# Patient Record
Sex: FEMALE | Race: WHITE | Employment: OTHER | URBAN - METROPOLITAN AREA
[De-identification: names, ages, dates, MRNs, and addresses within clinical notes are randomized per-mention and may not be internally consistent; named-entity substitution may affect disease eponyms.]

---

## 2017-11-28 PROBLEM — M19.90 GENERALIZED ARTHRITIS: Status: ACTIVE | Noted: 2017-11-28

## 2017-12-18 PROBLEM — E03.9 ACQUIRED HYPOTHYROIDISM: Status: ACTIVE | Noted: 2017-12-18

## 2017-12-18 PROBLEM — G30.9 ALZHEIMER'S DEMENTIA WITHOUT BEHAVIORAL DISTURBANCE (HCC): Chronic | Status: ACTIVE | Noted: 2017-12-18

## 2017-12-18 PROBLEM — F02.80 ALZHEIMER'S DEMENTIA WITHOUT BEHAVIORAL DISTURBANCE (HCC): Chronic | Status: ACTIVE | Noted: 2017-12-18

## 2018-01-05 PROBLEM — F33.9 RECURRENT DEPRESSION (HCC): Status: ACTIVE | Noted: 2018-01-05

## 2018-03-05 PROBLEM — E78.00 PURE HYPERCHOLESTEROLEMIA: Status: ACTIVE | Noted: 2018-03-05

## 2018-03-05 PROBLEM — F03.B0 MODERATE DEMENTIA WITHOUT BEHAVIORAL DISTURBANCE: Status: ACTIVE | Noted: 2018-03-05

## 2018-03-05 PROBLEM — E03.9 HYPOTHYROIDISM, ACQUIRED: Status: ACTIVE | Noted: 2018-03-05

## 2018-05-21 PROBLEM — G62.9 PERIPHERAL POLYNEUROPATHY: Status: ACTIVE | Noted: 2018-05-21

## 2018-10-23 ENCOUNTER — APPOINTMENT (OUTPATIENT)
Dept: CT IMAGING | Age: 83
End: 2018-10-23
Attending: EMERGENCY MEDICINE
Payer: MEDICARE

## 2018-10-23 ENCOUNTER — HOSPITAL ENCOUNTER (EMERGENCY)
Age: 83
Discharge: HOME | End: 2018-10-23
Attending: EMERGENCY MEDICINE
Payer: MEDICARE

## 2018-10-23 VITALS
BODY MASS INDEX: 22.82 KG/M2 | RESPIRATION RATE: 16 BRPM | HEART RATE: 69 BPM | OXYGEN SATURATION: 94 % | DIASTOLIC BLOOD PRESSURE: 84 MMHG | HEIGHT: 62 IN | WEIGHT: 124 LBS | TEMPERATURE: 98.3 F | SYSTOLIC BLOOD PRESSURE: 158 MMHG

## 2018-10-23 DIAGNOSIS — E78.00 PURE HYPERCHOLESTEROLEMIA: ICD-10-CM

## 2018-10-23 DIAGNOSIS — G30.9 ALZHEIMER'S DEMENTIA WITHOUT BEHAVIORAL DISTURBANCE, UNSPECIFIED TIMING OF DEMENTIA ONSET: Chronic | ICD-10-CM

## 2018-10-23 DIAGNOSIS — R10.13 ABDOMINAL PAIN, EPIGASTRIC: Primary | ICD-10-CM

## 2018-10-23 DIAGNOSIS — F02.80 ALZHEIMER'S DEMENTIA WITHOUT BEHAVIORAL DISTURBANCE, UNSPECIFIED TIMING OF DEMENTIA ONSET: Chronic | ICD-10-CM

## 2018-10-23 DIAGNOSIS — I10 ESSENTIAL HYPERTENSION: Chronic | ICD-10-CM

## 2018-10-23 LAB
ALBUMIN SERPL-MCNC: 3.6 G/DL (ref 3.2–4.6)
ALBUMIN/GLOB SERPL: 1 {RATIO} (ref 1.2–3.5)
ALP SERPL-CCNC: 62 U/L (ref 50–136)
ALT SERPL-CCNC: 25 U/L (ref 12–65)
ANION GAP SERPL CALC-SCNC: 7 MMOL/L (ref 7–16)
AST SERPL-CCNC: 28 U/L (ref 15–37)
BASOPHILS # BLD: 0 K/UL (ref 0–0.2)
BASOPHILS NFR BLD: 1 % (ref 0–2)
BILIRUB SERPL-MCNC: 0.3 MG/DL (ref 0.2–1.1)
BUN SERPL-MCNC: 20 MG/DL (ref 8–23)
CALCIUM SERPL-MCNC: 9.2 MG/DL (ref 8.3–10.4)
CHLORIDE SERPL-SCNC: 108 MMOL/L (ref 98–107)
CO2 SERPL-SCNC: 29 MMOL/L (ref 21–32)
CREAT SERPL-MCNC: 1.05 MG/DL (ref 0.6–1)
DIFFERENTIAL METHOD BLD: ABNORMAL
EOSINOPHIL # BLD: 0 K/UL (ref 0–0.8)
EOSINOPHIL NFR BLD: 1 % (ref 0.5–7.8)
ERYTHROCYTE [DISTWIDTH] IN BLOOD BY AUTOMATED COUNT: 12.6 %
GLOBULIN SER CALC-MCNC: 3.7 G/DL (ref 2.3–3.5)
GLUCOSE SERPL-MCNC: 91 MG/DL (ref 65–100)
HCT VFR BLD AUTO: 40.1 % (ref 35.8–46.3)
HGB BLD-MCNC: 12.6 G/DL (ref 11.7–15.4)
IMM GRANULOCYTES # BLD: 0 K/UL (ref 0–0.5)
IMM GRANULOCYTES NFR BLD AUTO: 0 % (ref 0–5)
LIPASE SERPL-CCNC: 123 U/L (ref 73–393)
LYMPHOCYTES # BLD: 1.3 K/UL (ref 0.5–4.6)
LYMPHOCYTES NFR BLD: 27 % (ref 13–44)
MCH RBC QN AUTO: 34 PG (ref 26.1–32.9)
MCHC RBC AUTO-ENTMCNC: 31.4 G/DL (ref 31.4–35)
MCV RBC AUTO: 108.1 FL (ref 79.6–97.8)
MONOCYTES # BLD: 0.5 K/UL (ref 0.1–1.3)
MONOCYTES NFR BLD: 10 % (ref 4–12)
NEUTS SEG # BLD: 3 K/UL (ref 1.7–8.2)
NEUTS SEG NFR BLD: 61 % (ref 43–78)
NRBC # BLD: 0 K/UL (ref 0–0.2)
PLATELET # BLD AUTO: 239 K/UL (ref 150–450)
PMV BLD AUTO: 9.9 FL (ref 9.4–12.3)
POTASSIUM SERPL-SCNC: 4.6 MMOL/L (ref 3.5–5.1)
PROT SERPL-MCNC: 7.3 G/DL (ref 6.3–8.2)
RBC # BLD AUTO: 3.71 M/UL (ref 4.05–5.2)
SODIUM SERPL-SCNC: 144 MMOL/L (ref 136–145)
WBC # BLD AUTO: 4.9 K/UL (ref 4.3–11.1)

## 2018-10-23 PROCEDURE — 74011000258 HC RX REV CODE- 258: Performed by: EMERGENCY MEDICINE

## 2018-10-23 PROCEDURE — 85025 COMPLETE CBC W/AUTO DIFF WBC: CPT

## 2018-10-23 PROCEDURE — 80053 COMPREHEN METABOLIC PANEL: CPT

## 2018-10-23 PROCEDURE — 74177 CT ABD & PELVIS W/CONTRAST: CPT

## 2018-10-23 PROCEDURE — 96360 HYDRATION IV INFUSION INIT: CPT | Performed by: EMERGENCY MEDICINE

## 2018-10-23 PROCEDURE — 83690 ASSAY OF LIPASE: CPT

## 2018-10-23 PROCEDURE — 81003 URINALYSIS AUTO W/O SCOPE: CPT | Performed by: EMERGENCY MEDICINE

## 2018-10-23 PROCEDURE — 74011250636 HC RX REV CODE- 250/636: Performed by: EMERGENCY MEDICINE

## 2018-10-23 PROCEDURE — 99284 EMERGENCY DEPT VISIT MOD MDM: CPT | Performed by: EMERGENCY MEDICINE

## 2018-10-23 PROCEDURE — 74011636320 HC RX REV CODE- 636/320: Performed by: EMERGENCY MEDICINE

## 2018-10-23 RX ORDER — SODIUM CHLORIDE 0.9 % (FLUSH) 0.9 %
10 SYRINGE (ML) INJECTION
Status: COMPLETED | OUTPATIENT
Start: 2018-10-23 | End: 2018-10-23

## 2018-10-23 RX ADMIN — SODIUM CHLORIDE 100 ML: 900 INJECTION, SOLUTION INTRAVENOUS at 17:46

## 2018-10-23 RX ADMIN — SODIUM CHLORIDE 500 ML: 900 INJECTION, SOLUTION INTRAVENOUS at 16:49

## 2018-10-23 RX ADMIN — Medication 10 ML: at 17:46

## 2018-10-23 RX ADMIN — DIATRIZOATE MEGLUMINE AND DIATRIZOATE SODIUM 15 ML: 660; 100 LIQUID ORAL; RECTAL at 16:49

## 2018-10-23 RX ADMIN — IOPAMIDOL 100 ML: 755 INJECTION, SOLUTION INTRAVENOUS at 17:46

## 2018-10-23 NOTE — DISCHARGE INSTRUCTIONS

## 2018-10-23 NOTE — ED TRIAGE NOTES
Patient went to PCP and started throwing up. States that she was there for her monthly check up. Says that her Douglas Fernandes has been giving me trouble. \" Point to diaphragm area. States that she has headache, neck pain and leg pain, Reports that she suddenly became nauseated and threw up once. Patient arrived from the office via EMS. Patient is a poor historian.

## 2018-10-23 NOTE — ED NOTES
I have reviewed discharge instructions with the patient. The patient verbalized understanding. Patient left ED via Discharge Method: wheelchair to Home with transport from [facility]. The patient has been wheeled to car via nurse and appears in no acute distress. The patient has been provided discharge instructions and follow up information. The patient does not have any questions at this time. Opportunity for questions and clarification provided. Patient given 0 scripts. To continue your aftercare when you leave the hospital, you may receive an automated call from our care team to check in on how you are doing. This is a free service and part of our promise to provide the best care and service to meet your aftercare needs.  If you have questions, or wish to unsubscribe from this service please call [phone]. Thank you for Choosing our [facility] Emergency Department.

## 2018-10-23 NOTE — ED PROVIDER NOTES
Patient is a poor historian, states she has been having upper abdominal pain \"for a while\" off and on. She is unable to specify further. She had an appointment today with her doctor. While there, she started vomiting and having her abdominal pain again. Her physician was concerned and sent her here for evaluation. She denies any workup in the past, is unable to describe her pain further. She does not remember what surgeries she has had in the past. 
 
Elements of this note were created using speech recognition software. As such, errors of speech recognition may be present. Past Medical History:  
Diagnosis Date  Arthritis  Atrial fibrillation ([facility]  Dementia  Depression  Heart failure ([facility] [address] [facility]  Hyperlipidemia  Hypertension  Hypothyroidism  Other ill-defined conditions(799.89) Past Surgical History:  
Procedure Laterality Date  HX CHOLECYSTECTOMY  HX HYSTERECTOMY  HX ORTHOPAEDIC    
 BACK  HX SMALL BOWEL RESECTION  8/8/13  HX TONSILLECTOMY  HX TUBAL LIGATION Family History:  
Problem Relation Age of Onset  Heart Disease Mother  Cancer Father   
     bladder  Cancer Brother   
     lung Social History Socioeconomic History  Marital status:  Spouse name: Not on file  Number of children: Not on file  Years of education: Not on file  Highest education level: Not on file Social Needs  Financial resource strain: Not on file  Food insecurity - worry: Not on file  Food insecurity - inability: Not on file  Transportation needs - medical: Not on file  Transportation needs - non-medical: Not on file Occupational History  Not on file Tobacco Use  Smoking status: Never Smoker  Smokeless tobacco: Never Used Substance and Sexual Activity  Alcohol use: No  
 Drug use: No  
 Sexual activity: No  
Other Topics Concern  Not on file Social History Narrative  Not on file ALLERGIES: Patient has no known allergies. Review of Systems Constitutional: Negative for chills and fever. Gastrointestinal: Negative for nausea and vomiting. All other systems reviewed and are negative. Vitals:  
 10/23/18 1516 BP: 147/80 Pulse: 66 Resp: 16 Temp: 98.2 °F (36.8 °C) SpO2: 94% Weight: 56.2 kg (124 lb) Height: 5' 2\" (1.575 m) Physical Exam  
Constitutional: She is oriented to person, place, and time. She appears well-developed and well-nourished. HENT:  
Head: Normocephalic and atraumatic. Eyes: Conjunctivae are normal. Pupils are equal, round, and reactive to light. Neck: Normal range of motion. Neck supple. Abdominal: Soft. There is tenderness. Epigastric and right upper quadrant tenderness to palpation as indicated. No rebound or guarding Musculoskeletal: She exhibits no edema or tenderness. Neurological: She is alert and oriented to person, place, and time. Skin: Skin is warm and dry. Psychiatric: She has a normal mood and affect. Her behavior is normal.  
Nursing note and vitals reviewed. MDM Number of Diagnoses or Management Options Abdominal pain, epigastric: new and requires workup Alzheimer's dementia without behavioral disturbance, unspecified timing of dementia onset:  
Essential hypertension: Pure hypercholesterolemia:  
Diagnosis management comments: 6:37 PM discussed results with patient, unremarkable CT. Crystal Zapata She has had a cholecystectomy, no abnormalities seen. Her repeat abdominal exam is benign and she appears comfortable and in no distress. Amount and/or Complexity of Data Reviewed Clinical lab tests: ordered and reviewed Tests in the radiology section of CPT®: ordered and reviewed Risk of Complications, Morbidity, and/or Mortality Presenting problems: moderate Diagnostic procedures: moderate Management options: moderate Patient Progress Patient progress: stable Procedures

## 2018-10-25 ENCOUNTER — HOME HEALTH ADMISSION (OUTPATIENT)
Dept: HOME HEALTH SERVICES | Facility: HOME HEALTH | Age: 83
End: 2018-10-25

## 2019-03-27 ENCOUNTER — HOSPITAL ENCOUNTER (EMERGENCY)
Dept: GENERAL RADIOLOGY | Age: 84
Discharge: HOME | DRG: 481 | End: 2019-03-27
Attending: EMERGENCY MEDICINE
Payer: MEDICARE

## 2019-03-27 ENCOUNTER — APPOINTMENT (OUTPATIENT)
Dept: CT IMAGING | Age: 84
DRG: 481 | End: 2019-03-27
Attending: EMERGENCY MEDICINE
Payer: MEDICARE

## 2019-03-27 ENCOUNTER — HOSPITAL ENCOUNTER (INPATIENT)
Age: 84
LOS: 5 days | Discharge: NURSING, REHAB OR LONG-TERM CARE | DRG: 481 | End: 2019-04-01
Attending: EMERGENCY MEDICINE | Admitting: INTERNAL MEDICINE
Payer: MEDICARE

## 2019-03-27 DIAGNOSIS — S72.142A CLOSED 2-PART INTERTROCHANTERIC FRACTURE OF LEFT FEMUR, INITIAL ENCOUNTER (HCC): Primary | ICD-10-CM

## 2019-03-27 PROBLEM — S72.002A HIP FRACTURE, LEFT (HCC): Status: ACTIVE | Noted: 2019-03-27

## 2019-03-27 LAB
ALBUMIN SERPL-MCNC: 3.2 G/DL (ref 3.2–4.6)
ALBUMIN/GLOB SERPL: 1.2 {RATIO} (ref 1.2–3.5)
ALP SERPL-CCNC: 69 U/L (ref 50–136)
ALT SERPL-CCNC: 30 U/L (ref 12–65)
ANION GAP SERPL CALC-SCNC: 6 MMOL/L (ref 7–16)
APPEARANCE UR: ABNORMAL
APTT PPP: 29.2 SEC (ref 24.7–39.8)
AST SERPL-CCNC: 34 U/L (ref 15–37)
BACTERIA SPEC CULT: ABNORMAL
BACTERIA SPEC CULT: ABNORMAL
BACTERIA URNS QL MICRO: ABNORMAL /HPF
BASOPHILS # BLD: 0 K/UL (ref 0–0.2)
BASOPHILS NFR BLD: 0 % (ref 0–2)
BILIRUB SERPL-MCNC: 0.5 MG/DL (ref 0.2–1.1)
BILIRUB UR QL: NEGATIVE
BUN SERPL-MCNC: 24 MG/DL (ref 8–23)
CALCIUM SERPL-MCNC: 8.3 MG/DL (ref 8.3–10.4)
CALCIUM SERPL-MCNC: 8.4 MG/DL (ref 8.3–10.4)
CASTS URNS QL MICRO: ABNORMAL /LPF
CHLORIDE SERPL-SCNC: 108 MMOL/L (ref 98–107)
CO2 SERPL-SCNC: 30 MMOL/L (ref 21–32)
COLOR UR: ABNORMAL
CREAT SERPL-MCNC: 1.03 MG/DL (ref 0.6–1)
CRYSTALS URNS QL MICRO: ABNORMAL /LPF
DIFFERENTIAL METHOD BLD: ABNORMAL
EOSINOPHIL # BLD: 0 K/UL (ref 0–0.8)
EOSINOPHIL NFR BLD: 0 % (ref 0.5–7.8)
EPI CELLS #/AREA URNS HPF: ABNORMAL /HPF
ERYTHROCYTE [DISTWIDTH] IN BLOOD BY AUTOMATED COUNT: 12.2 % (ref 11.9–14.6)
GLOBULIN SER CALC-MCNC: 2.6 G/DL (ref 2.3–3.5)
GLUCOSE SERPL-MCNC: 126 MG/DL (ref 65–100)
GLUCOSE UR STRIP.AUTO-MCNC: NEGATIVE MG/DL
HCT VFR BLD AUTO: 30.7 % (ref 35.8–46.3)
HGB BLD-MCNC: 9.7 G/DL (ref 11.7–15.4)
HGB UR QL STRIP: NEGATIVE
IMM GRANULOCYTES # BLD AUTO: 0.1 K/UL (ref 0–0.5)
IMM GRANULOCYTES NFR BLD AUTO: 1 % (ref 0–5)
INR PPP: 1
KETONES UR QL STRIP.AUTO: NEGATIVE MG/DL
LEUKOCYTE ESTERASE UR QL STRIP.AUTO: ABNORMAL
LYMPHOCYTES # BLD: 1 K/UL (ref 0.5–4.6)
LYMPHOCYTES NFR BLD: 8 % (ref 13–44)
MCH RBC QN AUTO: 33.2 PG (ref 26.1–32.9)
MCHC RBC AUTO-ENTMCNC: 31.6 G/DL (ref 31.4–35)
MCV RBC AUTO: 105.1 FL (ref 79.6–97.8)
MONOCYTES # BLD: 1.4 K/UL (ref 0.1–1.3)
MONOCYTES NFR BLD: 11 % (ref 4–12)
MUCOUS THREADS URNS QL MICRO: ABNORMAL /LPF
NEUTS SEG # BLD: 10.2 K/UL (ref 1.7–8.2)
NEUTS SEG NFR BLD: 81 % (ref 43–78)
NITRITE UR QL STRIP.AUTO: NEGATIVE
NRBC # BLD: 0 K/UL (ref 0–0.2)
OTHER OBSERVATIONS,UCOM: ABNORMAL
PH UR STRIP: 5.5 [PH] (ref 5–9)
PLATELET # BLD AUTO: 215 K/UL (ref 150–450)
PMV BLD AUTO: 10.2 FL (ref 9.4–12.3)
POTASSIUM SERPL-SCNC: 4 MMOL/L (ref 3.5–5.1)
PREALB SERPL-MCNC: 23.5 MG/DL (ref 18–35.7)
PROT SERPL-MCNC: 5.8 G/DL (ref 6.3–8.2)
PROT UR STRIP-MCNC: NEGATIVE MG/DL
PROTHROMBIN TIME: 13.4 SEC (ref 11.7–14.5)
PTH-INTACT SERPL-MCNC: 61.7 PG/ML (ref 18.5–88)
RBC # BLD AUTO: 2.92 M/UL (ref 4.05–5.2)
RBC #/AREA URNS HPF: ABNORMAL /HPF
SERVICE CMNT-IMP: ABNORMAL
SODIUM SERPL-SCNC: 144 MMOL/L (ref 136–145)
SP GR UR REFRACTOMETRY: 1.03 (ref 1–1.02)
UROBILINOGEN UR QL STRIP.AUTO: 0.2 EU/DL (ref 0.2–1)
WBC # BLD AUTO: 12.7 K/UL (ref 4.3–11.1)
WBC URNS QL MICRO: ABNORMAL /HPF

## 2019-03-27 PROCEDURE — 84134 ASSAY OF PREALBUMIN: CPT

## 2019-03-27 PROCEDURE — 65270000029 HC RM PRIVATE

## 2019-03-27 PROCEDURE — 74011000302 HC RX REV CODE- 302: Performed by: INTERNAL MEDICINE

## 2019-03-27 PROCEDURE — 77030034849

## 2019-03-27 PROCEDURE — 81001 URINALYSIS AUTO W/SCOPE: CPT

## 2019-03-27 PROCEDURE — 74011250636 HC RX REV CODE- 250/636: Performed by: NURSE PRACTITIONER

## 2019-03-27 PROCEDURE — 85610 PROTHROMBIN TIME: CPT

## 2019-03-27 PROCEDURE — 77030021907 HC KT URIN FOL O&M -A

## 2019-03-27 PROCEDURE — 85025 COMPLETE CBC W/AUTO DIFF WBC: CPT

## 2019-03-27 PROCEDURE — 73552 X-RAY EXAM OF FEMUR 2/>: CPT

## 2019-03-27 PROCEDURE — 74011250637 HC RX REV CODE- 250/637: Performed by: NURSE PRACTITIONER

## 2019-03-27 PROCEDURE — 93005 ELECTROCARDIOGRAM TRACING: CPT | Performed by: EMERGENCY MEDICINE

## 2019-03-27 PROCEDURE — 77030020263 HC SOL INJ SOD CL0.9% LFCR 1000ML

## 2019-03-27 PROCEDURE — 83970 ASSAY OF PARATHORMONE: CPT

## 2019-03-27 PROCEDURE — 80053 COMPREHEN METABOLIC PANEL: CPT

## 2019-03-27 PROCEDURE — 74011250637 HC RX REV CODE- 250/637: Performed by: INTERNAL MEDICINE

## 2019-03-27 PROCEDURE — 74011250637 HC RX REV CODE- 250/637: Performed by: ORTHOPAEDIC SURGERY

## 2019-03-27 PROCEDURE — 73502 X-RAY EXAM HIP UNI 2-3 VIEWS: CPT

## 2019-03-27 PROCEDURE — 74011250636 HC RX REV CODE- 250/636: Performed by: INTERNAL MEDICINE

## 2019-03-27 PROCEDURE — 87641 MR-STAPH DNA AMP PROBE: CPT

## 2019-03-27 PROCEDURE — 71045 X-RAY EXAM CHEST 1 VIEW: CPT

## 2019-03-27 PROCEDURE — 99285 EMERGENCY DEPT VISIT HI MDM: CPT | Performed by: EMERGENCY MEDICINE

## 2019-03-27 PROCEDURE — 86580 TB INTRADERMAL TEST: CPT | Performed by: INTERNAL MEDICINE

## 2019-03-27 PROCEDURE — 82306 VITAMIN D 25 HYDROXY: CPT

## 2019-03-27 PROCEDURE — 85730 THROMBOPLASTIN TIME PARTIAL: CPT

## 2019-03-27 PROCEDURE — 86900 BLOOD TYPING SEROLOGIC ABO: CPT

## 2019-03-27 PROCEDURE — 77030032490 HC SLV COMPR SCD KNE COVD -B

## 2019-03-27 PROCEDURE — 77030036696 HC BOOT TRACT BUCKS S2SG -A

## 2019-03-27 PROCEDURE — 86923 COMPATIBILITY TEST ELECTRIC: CPT

## 2019-03-27 PROCEDURE — 70450 CT HEAD/BRAIN W/O DYE: CPT

## 2019-03-27 RX ORDER — AMIODARONE HYDROCHLORIDE 200 MG/1
200 TABLET ORAL DAILY
Status: DISCONTINUED | OUTPATIENT
Start: 2019-03-28 | End: 2019-04-01 | Stop reason: HOSPADM

## 2019-03-27 RX ORDER — PRAVASTATIN SODIUM 20 MG/1
40 TABLET ORAL
Status: DISCONTINUED | OUTPATIENT
Start: 2019-03-27 | End: 2019-04-01 | Stop reason: HOSPADM

## 2019-03-27 RX ORDER — ACETAMINOPHEN 325 MG/1
650 TABLET ORAL EVERY 8 HOURS
Status: DISCONTINUED | OUTPATIENT
Start: 2019-03-27 | End: 2019-03-28 | Stop reason: HOSPADM

## 2019-03-27 RX ORDER — ONDANSETRON 2 MG/ML
4 INJECTION INTRAMUSCULAR; INTRAVENOUS
Status: DISCONTINUED | OUTPATIENT
Start: 2019-03-27 | End: 2019-03-28 | Stop reason: SDUPTHER

## 2019-03-27 RX ORDER — PANTOPRAZOLE SODIUM 40 MG/1
40 TABLET, DELAYED RELEASE ORAL
Status: DISCONTINUED | OUTPATIENT
Start: 2019-03-28 | End: 2019-04-01 | Stop reason: HOSPADM

## 2019-03-27 RX ORDER — SODIUM CHLORIDE 9 MG/ML
75 INJECTION, SOLUTION INTRAVENOUS CONTINUOUS
Status: DISCONTINUED | OUTPATIENT
Start: 2019-03-27 | End: 2019-03-28 | Stop reason: HOSPADM

## 2019-03-27 RX ORDER — TRAMADOL HYDROCHLORIDE 50 MG/1
50 TABLET ORAL
Status: DISCONTINUED | OUTPATIENT
Start: 2019-03-27 | End: 2019-03-27 | Stop reason: SDUPTHER

## 2019-03-27 RX ORDER — OXYCODONE HYDROCHLORIDE 5 MG/1
5 TABLET ORAL
Status: DISCONTINUED | OUTPATIENT
Start: 2019-03-27 | End: 2019-03-28

## 2019-03-27 RX ORDER — DULOXETIN HYDROCHLORIDE 60 MG/1
60 CAPSULE, DELAYED RELEASE ORAL 2 TIMES DAILY
Status: DISCONTINUED | OUTPATIENT
Start: 2019-03-27 | End: 2019-04-01 | Stop reason: HOSPADM

## 2019-03-27 RX ORDER — LEVOTHYROXINE SODIUM 100 UG/1
100 TABLET ORAL
Status: DISCONTINUED | OUTPATIENT
Start: 2019-03-28 | End: 2019-04-01 | Stop reason: HOSPADM

## 2019-03-27 RX ORDER — SODIUM CHLORIDE 0.9 % (FLUSH) 0.9 %
5-40 SYRINGE (ML) INJECTION AS NEEDED
Status: DISCONTINUED | OUTPATIENT
Start: 2019-03-27 | End: 2019-03-28 | Stop reason: HOSPADM

## 2019-03-27 RX ORDER — HYDROMORPHONE HYDROCHLORIDE 1 MG/ML
0.5 INJECTION, SOLUTION INTRAMUSCULAR; INTRAVENOUS; SUBCUTANEOUS
Status: DISCONTINUED | OUTPATIENT
Start: 2019-03-27 | End: 2019-03-28

## 2019-03-27 RX ORDER — GABAPENTIN 300 MG/1
300 CAPSULE ORAL 3 TIMES DAILY
Status: DISCONTINUED | OUTPATIENT
Start: 2019-03-27 | End: 2019-04-01 | Stop reason: HOSPADM

## 2019-03-27 RX ORDER — MONTELUKAST SODIUM 10 MG/1
10 TABLET ORAL DAILY
Status: DISCONTINUED | OUTPATIENT
Start: 2019-03-28 | End: 2019-04-01 | Stop reason: HOSPADM

## 2019-03-27 RX ORDER — SODIUM CHLORIDE 0.9 % (FLUSH) 0.9 %
5-40 SYRINGE (ML) INJECTION EVERY 8 HOURS
Status: DISCONTINUED | OUTPATIENT
Start: 2019-03-27 | End: 2019-03-28 | Stop reason: HOSPADM

## 2019-03-27 RX ORDER — DONEPEZIL HYDROCHLORIDE 5 MG/1
10 TABLET, FILM COATED ORAL
Status: DISCONTINUED | OUTPATIENT
Start: 2019-03-27 | End: 2019-04-01 | Stop reason: HOSPADM

## 2019-03-27 RX ORDER — HYDROCODONE BITARTRATE AND ACETAMINOPHEN 7.5; 325 MG/1; MG/1
1 TABLET ORAL
Status: DISCONTINUED | OUTPATIENT
Start: 2019-03-27 | End: 2019-03-27 | Stop reason: SDUPTHER

## 2019-03-27 RX ADMIN — SODIUM CHLORIDE 75 ML/HR: 900 INJECTION, SOLUTION INTRAVENOUS at 15:18

## 2019-03-27 RX ADMIN — DONEPEZIL HYDROCHLORIDE 10 MG: 5 TABLET, FILM COATED ORAL at 20:20

## 2019-03-27 RX ADMIN — ACETAMINOPHEN 650 MG: 325 TABLET, FILM COATED ORAL at 15:18

## 2019-03-27 RX ADMIN — PRAVASTATIN SODIUM 40 MG: 20 TABLET ORAL at 20:20

## 2019-03-27 RX ADMIN — OXYCODONE HYDROCHLORIDE 5 MG: 5 TABLET ORAL at 20:20

## 2019-03-27 RX ADMIN — GABAPENTIN 300 MG: 300 CAPSULE ORAL at 20:20

## 2019-03-27 RX ADMIN — ONDANSETRON 4 MG: 2 INJECTION INTRAMUSCULAR; INTRAVENOUS at 21:18

## 2019-03-27 RX ADMIN — HYDROMORPHONE HYDROCHLORIDE 0.5 MG: 1 INJECTION, SOLUTION INTRAMUSCULAR; INTRAVENOUS; SUBCUTANEOUS at 23:43

## 2019-03-27 RX ADMIN — DULOXETINE 60 MG: 60 CAPSULE, DELAYED RELEASE ORAL at 17:26

## 2019-03-27 RX ADMIN — Medication 1 AMPULE: at 20:21

## 2019-03-27 RX ADMIN — TUBERCULIN PURIFIED PROTEIN DERIVATIVE 5 UNITS: 5 INJECTION, SOLUTION INTRADERMAL at 15:39

## 2019-03-27 RX ADMIN — Medication 5 ML: at 15:18

## 2019-03-27 RX ADMIN — ACETAMINOPHEN 650 MG: 325 TABLET, FILM COATED ORAL at 20:20

## 2019-03-27 RX ADMIN — GABAPENTIN 300 MG: 300 CAPSULE ORAL at 15:18

## 2019-03-27 NOTE — PROGRESS NOTES
Pt's daughter Nevaeh De Leon left name and number on board: [phone]. Brandyn Olea stated she would be available on this line at any time if any needs of the patient present.

## 2019-03-27 NOTE — ED PROVIDER NOTES
Patient presents by EMS from home after apparent fall last night that was unwitnessed. It is presumed the patient managed to drag herself in her bedroom where she was found. Patient cannot give a clear history or review of systems at this time, as there is a history of dementia. The history is provided by the patient, the EMS personnel and medical records. Hip Pain This is a new problem. The current episode started 6 to 12 hours ago. The problem occurs constantly. The problem has not changed since onset. The pain is present in the left hip. The quality of the pain is described as aching. The pain is at a severity of 6/10. The pain is moderate. Pertinent negatives include no numbness, no stiffness, no tingling, no back pain and no neck pain. The symptoms are aggravated by contact, movement and palpation. She has tried nothing for the symptoms. The treatment provided no relief. There has been a history of trauma. Past Medical History:  
Diagnosis Date  Arthritis  Atrial fibrillation ([address]  Dementia  Depression  Heart failure ([address]  Hyperlipidemia  Hypertension  Hypothyroidism  Other ill-defined conditions(799.89) Past Surgical History:  
Procedure Laterality Date  HX CHOLECYSTECTOMY  HX HYSTERECTOMY  HX ORTHOPAEDIC    
 BACK  HX SMALL BOWEL RESECTION  8/8/13  HX TONSILLECTOMY  HX TUBAL LIGATION Family History:  
Problem Relation Age of Onset  Heart Disease Mother  Cancer Father   
     bladder  Cancer Brother   
     lung Social History Socioeconomic History  Marital status:  Spouse name: Not on file  Number of children: Not on file  Years of education: Not on file  Highest education level: Not on file Occupational History  Not on file Social Needs  Financial resource strain: Not on file  Food insecurity:  
  Worry: Not on file Inability: Not on file  Transportation needs:  
  Medical: Not on file Non-medical: Not on file Tobacco Use  Smoking status: Never Smoker  Smokeless tobacco: Never Used Substance and Sexual Activity  Alcohol use: No  
 Drug use: No  
 Sexual activity: Never Lifestyle  Physical activity:  
  Days per week: Not on file Minutes per session: Not on file  Stress: Not on file Relationships  Social connections:  
  Talks on phone: Not on file Gets together: Not on file Attends Jew service: Not on file Active member of club or organization: Not on file Attends meetings of clubs or organizations: Not on file Relationship status: Not on file  Intimate partner violence:  
  Fear of current or ex partner: Not on file Emotionally abused: Not on file Physically abused: Not on file Forced sexual activity: Not on file Other Topics Concern  Not on file Social History Narrative  Not on file ALLERGIES: Patient has no known allergies. Review of Systems Musculoskeletal: Negative for back pain, neck pain and stiffness. Neurological: Negative for tingling and numbness. All other systems reviewed and are negative. Vitals:  
 03/27/19 1240 03/27/19 1242 03/27/19 1245 03/27/19 1250 BP: 154/71 154/71 Pulse:   67 Resp: 16 Temp: 97.5 °F (36.4 °C) SpO2:   98% 97% Weight: 52.6 kg (116 lb) Height: 5' 2\" (1.575 m) Physical Exam  
Constitutional: She appears well-developed and well-nourished. No distress. HENT:  
Head: Normocephalic and atraumatic. Nose: Nose normal.  
No evidence of trauma to the head is noted however given the unwitnessed fall and CT will be performed. Mucous membranes are dry Eyes: Pupils are equal, round, and reactive to light. Conjunctivae and EOM are normal.  
Neck: Normal range of motion. Neck supple. Cardiovascular: Normal rate and regular rhythm. Pulmonary/Chest: Effort normal and breath sounds normal.  
Abdominal: Soft. Bowel sounds are normal.  
Musculoskeletal: She exhibits tenderness and deformity. Left lower extremity demonstrates shortening and external rotation with tenderness over the left hip joint and greater trochanter. PMS intact Neurological: She is alert. She displays normal reflexes. No cranial nerve deficit or sensory deficit. She exhibits normal muscle tone. Coordination normal.  
Disoriented to time Skin: Skin is warm and dry. Capillary refill takes less than 2 seconds. She is not diaphoretic. Psychiatric: She has a normal mood and affect. Her behavior is normal.  
Nursing note and vitals reviewed. MDM Number of Diagnoses or Management Options Diagnosis management comments: Hip fracture protocol was initiated Amount and/or Complexity of Data Reviewed Clinical lab tests: ordered and reviewed Tests in the radiology section of CPT®: ordered and reviewed Review and summarize past medical records: yes Discuss the patient with other providers: yes Independent visualization of images, tracings, or specimens: yes Risk of Complications, Morbidity, and/or Mortality Presenting problems: high Diagnostic procedures: high Management options: high Patient Progress Patient progress: stable Procedures

## 2019-03-27 NOTE — ED TRIAGE NOTES
EMS called to home for fall. Pt found laying bedroom floor underneath bed. Left leg shortened and rotated. C/o headache, is on blood thinner, 18g RAC. Given 10mg morphine over 30 minute time period. On 3 L at 97%, RR 12-16, /62, bgl 106, HR 70s.

## 2019-03-27 NOTE — PROGRESS NOTES
TRANSFER - IN REPORT: 
 
Verbal report received from [facility] (name) on Enrico Campoverde  being received from ED (unit) for routine progression of care Report consisted of patients Situation, Background, Assessment and  
Recommendations(SBAR). Information from the following report(s) SBAR, Kardex, ED Summary, Intake/Output and MAR was reviewed with the receiving nurse. Opportunity for questions and clarification was provided. Assessment completed upon patients arrival to unit and care assumed.

## 2019-03-27 NOTE — PROGRESS NOTES
03/27/19 1444 Dual Skin Pressure Injury Assessment Dual Skin Pressure Injury Assessment WDL Second Care Provider (Based on [address] [place] [address]) Aisha MickHeather pardobrielle Albert Lea Skin Integumentary Skin Integumentary (WDL) X Skin Color Appropriate for ethnicity; Ecchymosis (comment) (bruises BUE, BLE) Skin Condition/Temp Dry;Fragile; Warm  
Skin Integrity Tear;Scars (comment) 
(small skin tear to RLE; scar down mid, lower back) Turgor Epidermis thin w/ loss of subcut tissue Hair Growth Present Varicosities Present Wound Prevention and Protection Methods Orientation of Wound Prevention Posterior Location of Wound Prevention Sacrum/Coccyx Dressing Present  No  
Wound Offloading (Prevention Methods) Bed, pressure reduction mattress;Pillows;Repositioning;Turning

## 2019-03-27 NOTE — PROGRESS NOTES
Problem: Falls - Risk of 
Goal: *Absence of Falls Description Document Samina Mckenna Fall Risk and appropriate interventions in the flowsheet. Outcome: Progressing Towards Goal 
  
Problem: Patient Education: Go to Patient Education Activity Goal: Patient/Family Education Outcome: Progressing Towards Goal 
  
Problem: Patient Education: Go to Patient Education Activity Goal: Patient/Family Education Outcome: Progressing Towards Goal 
  
Oriented both patient and family to room and treatment team. Educated family and patient regarding risk for falls, and ensured pt had yellow socks, side rails up x3, and call light within reach. Time was left for questions and concerns to be addressed.

## 2019-03-27 NOTE — H&P
Hospitalist H&P Note Admit Date:  3/27/2019 12:41 PM  
Name:  Enrico Campoverde Age:  80 y.o. 
:  1933 MRN:  [id number] PCP:  Steven Lawler MD 
Treatment Team: Attending Provider: Agnes Goodwin MD; Consulting Provider: Fran Guzman MD 
 
HPI/Subjective:  
Ms. Teddy Shabazz is an 79 y/o WF with a h/o AFib (unclear if on [address] [place] [address] Road), dementia, hypothyroidism, HTN, HLD brought in to the ED via EMS today after she was found on the den floor of her home by her daughter earlier this morning. Due to underlying dementia, patient does not recall the specifics of the fall. Patient's daughter says she went over to check on the patient and found her on the floor of the den and one of the [employer] was displaced. She thinks patient may have tripped from or onto the chair causing her fall. She c/o left hip pain and has an intertrochanteric hip fracture. Head CT normal, daughter feels the patient is mentating at her baseline. Patient denies any chest pain, palpitations, syncope, pre-syncope, n/v/d, fevers. She does have h/o atrial fibrillation on amiodarone and her daughter says she's on a blood thinner but can't tell me which one (and I don't see one listed on her PCP notes). Hospitalist consulted for admission. 10 systems reviewed and negative except as noted in HPI. Past Medical History:  
Diagnosis Date  Arthritis  Atrial fibrillation ([address]  Dementia  Depression  Heart failure ([address]  Hyperlipidemia  Hypertension  Hypothyroidism  Other ill-defined conditions(799.89) Past Surgical History:  
Procedure Laterality Date  HX CHOLECYSTECTOMY  HX HYSTERECTOMY  HX ORTHOPAEDIC    
 BACK  HX SMALL BOWEL RESECTION  13  HX TONSILLECTOMY  HX TUBAL LIGATION No Known Allergies Social History Tobacco Use  Smoking status: Never Smoker  Smokeless tobacco: Never Used Substance Use Topics  Alcohol use:  No  
  
 Family History Problem Relation Age of Onset  Heart Disease Mother  Cancer Father   
     bladder  Cancer Brother   
     lung Immunization History Administered Date(s) Administered  Influenza High Dose Vaccine PF 2012, 10/07/2017  Influenza Vaccine 2016  Influenza Vaccine (Quad) PF 2018  Influenza Vaccine PF 2013  Pneumococcal Conjugate (PCV-13) 2017  Pneumococcal Polysaccharide (PPSV-23) 2001  TB Skin Test (PPD) Intradermal 2013, 2013  Tdap 2017 PTA Medications: 
Prior to Admission Medications Prescriptions Last Dose Informant Patient Reported? Taking? CALCIUM CITRATE PO   Yes No  
Sig: Take  by mouth. DULoxetine (CYMBALTA) 60 mg capsule   No No  
Sig: Take 1 Cap by mouth two (2) times a day. acetaminophen (TYLENOL EXTRA STRENGTH) 500 mg tablet   Yes No  
Sig: Take  by mouth every six (6) hours as needed for Pain. amiodarone (CORDARONE) 200 mg tablet   No No  
Sig: Take 1 Tab by mouth daily. ascorbic acid, vitamin C, (VITAMIN C) 1,000 mg tablet   Yes No  
Sig: Take  by mouth. azelastine (ASTELIN) 137 mcg (0.1 %) nasal spray   No No  
Si [place]en by Both Nostrils route two (2) times a day. Use in each nostril as directed  
cholecalciferol, VITAMIN D3, (VITAMIN D3) 5,000 unit tab tablet   Yes No  
Sig: Take  by mouth daily. cyanocobalamin (VITAMIN B-12) 2,500 mcg sublingual tablet   Yes No  
Sig: Take 2,500 mcg by mouth daily. donepezil (ARICEPT) 10 mg tablet   Yes No  
Sig: Take 10 mg by mouth nightly. fluticasone (FLONASE) 50 mcg/actuation nasal spray   No No  
Si Sprays by Both Nostrils route daily. fluticasone (FLONASE) 50 mcg/actuation nasal spray   No No  
Si Sprays by Both Nostrils route daily. gabapentin (NEURONTIN) 300 mg capsule   No No  
Sig: Take 1 Cap by mouth three (3) times daily.   
levothyroxine (SYNTHROID) 100 mcg tablet   No No  
 Sig: Take 1 Tab by mouth Daily (before breakfast). montelukast (SINGULAIR) 10 mg tablet   No No  
Sig: Take 1 Tab by mouth daily. multivitamin (HAIR,SKIN AND NAILS) tablet   Yes No  
Sig: Take 1 Tab by mouth daily. pantoprazole (PROTONIX) 40 mg tablet   No No  
Sig: Take 1 Tab by mouth daily. pravastatin (PRAVACHOL) 40 mg tablet   No No  
Sig: Take 1 Tab by mouth nightly. zinc gluconate 100 mg tab   Yes No  
Sig: Take  by mouth.  
zolpidem (AMBIEN) 5 mg tablet   No No  
Sig: Take 1 Tab by mouth nightly as needed (insomnia). Max Daily Amount: 5 mg. Facility-Administered Medications: None Objective:  
 
Patient Vitals for the past 24 hrs: 
 Temp Pulse Resp BP SpO2  
03/27/19 1500 97.4 °F (36.3 °C) 60 18 109/66 96 % 03/27/19 1250     97 % 03/27/19 1245  67   98 % 03/27/19 1242    154/71   
03/27/19 1240 97.5 °F (36.4 °C)  16 154/71  Oxygen Therapy O2 Sat (%): 96 % (03/27/19 1500) Pulse via Oximetry: 70 beats per minute (03/27/19 1250) O2 Device: Room air (03/27/19 1240) No intake or output data in the 24 hours ending 03/27/19 1504 *Note that automatically entered I/Os may not be accurate; dependent on patient compliance with collection and accurate  by assistants. Physical Exam: 
General:    Well nourished. Alert. Oriented to person but not place or time (says she's at doctor's office). Eyes:   Normal sclera. Extraocular movements intact. HENT:  Normocephalic, atraumatic. Moist mucous membranes CV:   Irreg irreg. No m/r/g. Peripheral pulses 2+. Capillary refill <2s. Lungs:  CTAB. No wheezing, rhonchi, or rales. Abdomen: Soft, nontender, nondistended. Extremities: Warm and dry. No cyanosis or edema. Tenderness to palpation over left greater trochanter w/o apparent bruising or rash. Neurologic: CN II-XII grossly intact. Sensation intact. Skin:     No rashes or jaundice. Normal coloration Psych:  Normal mood and affect. I reviewed the labs, imaging, EKGs, telemetry, and other studies done this admission. Data Review:  
Recent Results (from the past 24 hour(s)) TYPE & SCREEN Collection Time: 03/27/19 12:47 PM  
Result Value Ref Range Crossmatch Expiration 03/30/2019 ABO/Rh(D) O POSITIVE Antibody screen NEG   
CBC WITH AUTOMATED DIFF Collection Time: 03/27/19 12:48 PM  
Result Value Ref Range WBC 12.7 (H) 4.3 - 11.1 K/uL  
 RBC 2.92 (L) 4.05 - 5.2 M/uL HGB 9.7 (L) 11.7 - 15.4 g/dL HCT 30.7 (L) 35.8 - 46.3 % .1 (H) 79.6 - 97.8 FL  
 MCH 33.2 (H) 26.1 - 32.9 PG  
 MCHC 31.6 31.4 - 35.0 g/dL  
 RDW 12.2 11.9 - 14.6 % PLATELET 550 942 - 570 K/uL MPV 10.2 9.4 - 12.3 FL ABSOLUTE NRBC 0.00 0.0 - 0.2 K/uL  
 DF AUTOMATED NEUTROPHILS 81 (H) 43 - 78 % LYMPHOCYTES 8 (L) 13 - 44 % MONOCYTES 11 4.0 - 12.0 % EOSINOPHILS 0 (L) 0.5 - 7.8 % BASOPHILS 0 0.0 - 2.0 % IMMATURE GRANULOCYTES 1 0.0 - 5.0 %  
 ABS. NEUTROPHILS 10.2 (H) 1.7 - 8.2 K/UL  
 ABS. LYMPHOCYTES 1.0 0.5 - 4.6 K/UL  
 ABS. MONOCYTES 1.4 (H) 0.1 - 1.3 K/UL  
 ABS. EOSINOPHILS 0.0 0.0 - 0.8 K/UL  
 ABS. BASOPHILS 0.0 0.0 - 0.2 K/UL  
 ABS. IMM. GRANS. 0.1 0.0 - 0.5 K/UL METABOLIC PANEL, COMPREHENSIVE Collection Time: 03/27/19 12:48 PM  
Result Value Ref Range Sodium 144 136 - 145 mmol/L Potassium 4.0 3.5 - 5.1 mmol/L Chloride 108 (H) 98 - 107 mmol/L  
 CO2 30 21 - 32 mmol/L Anion gap 6 (L) 7 - 16 mmol/L Glucose 126 (H) 65 - 100 mg/dL BUN 24 (H) 8 - 23 MG/DL Creatinine 1.03 (H) 0.6 - 1.0 MG/DL  
 GFR est AA >60 >60 ml/min/1.73m2 GFR est non-AA 54 (L) >60 ml/min/1.73m2 Calcium 8.3 8.3 - 10.4 MG/DL Bilirubin, total 0.5 0.2 - 1.1 MG/DL  
 ALT (SGPT) 30 12 - 65 U/L  
 AST (SGOT) 34 15 - 37 U/L Alk. phosphatase 69 50 - 136 U/L Protein, total 5.8 (L) 6.3 - 8.2 g/dL Albumin 3.2 3.2 - 4.6 g/dL Globulin 2.6 2.3 - 3.5 g/dL A-G Ratio 1.2 1.2 - 3.5 PTT Collection Time: 03/27/19  2:28 PM  
Result Value Ref Range aPTT 29.2 24.7 - 39.8 SEC PROTHROMBIN TIME + INR Collection Time: 03/27/19  2:28 PM  
Result Value Ref Range Prothrombin time 13.4 11.7 - 14.5 sec INR 1.0 All Micro Results Procedure Component Value Units Date/Time MSSA/MRSA SC BY PCR, NASAL SWAB [[id number]] Order Status:  Sent Specimen:  Swab Current Facility-Administered Medications Medication Dose Route Frequency  [START ON 3/28/2019] amiodarone (CORDARONE) tablet 200 mg  200 mg Oral DAILY  donepezil (ARICEPT) tablet 10 mg  10 mg Oral QHS  DULoxetine (CYMBALTA) capsule 60 mg  60 mg Oral BID  
 gabapentin (NEURONTIN) capsule 300 mg  300 mg Oral TID  [START ON 3/28/2019] levothyroxine (SYNTHROID) tablet 100 mcg  100 mcg Oral ACB  [START ON 3/28/2019] montelukast (SINGULAIR) tablet 10 mg  10 mg Oral DAILY  [START ON 3/28/2019] pantoprazole (PROTONIX) tablet 40 mg  40 mg Oral ACB  pravastatin (PRAVACHOL) tablet 40 mg  40 mg Oral QHS  
 0.9% sodium chloride infusion  75 mL/hr IntraVENous CONTINUOUS  
 sodium chloride (NS) flush 5-40 mL  5-40 mL IntraVENous Q8H  
 sodium chloride (NS) flush 5-40 mL  5-40 mL IntraVENous PRN  
 acetaminophen (TYLENOL) tablet 650 mg  650 mg Oral Q8H  
 HYDROcodone-acetaminophen (NORCO) 7.5-325 mg per tablet 1 Tab  1 Tab Oral Q6H PRN  
 tuberculin injection 5 Units  5 Units IntraDERMal ONCE  
 ondansetron (ZOFRAN) injection 4 mg  4 mg IntraVENous Q6H PRN Other Studies: Xr Chest Sngl V Result Date: 3/27/2019 Chest X-ray INDICATION: 70-year-old female status post fall with hip fracture repair Comparison exams: 9/19/2013 A portable AP view of the chest was obtained. FINDINGS: The lungs are clear. There are no infiltrates or effusions. The heart size is normal. Supine positioning limits evaluation for any subtle pneumothorax.  No visible large pneumothorax seen. The bony thorax is intact. There are calcified lung nodules identified in the right lower lobe. IMPRESSION: No acute findings in the chest  
 
Xr Hip Lt W Or Wo Pelv 2-3 Vws Result Date: 3/27/2019 Indication:Left hip pain status post fall Comparison exams: AP view the pelvis and left hip films dated 4/12/2013 Findings: AP view of the pelvis illustrates intact pelvic ring. Normal bone density. Right hip is within normal limits. There is a comminuted intertrochanteric fracture of the left hip. No dislocation left femoral head. New in comparison to previous study is irregularity in the bone density of the greater trochanter and subcapital region of the left hip. Unclear whether this presents a posttraumatic change versus fracture caused by underlying pathology. Impression:  Comminuted intertrochanteric fracture left hip with no dislocation of the femoral head. Slight irregularity in bone density noted in the greater trochanter and subcapital region as described above. Xr Femur Lt 2 V Result Date: 3/27/2019 Indication:80year-old female status post fall Comparison exams: None Findings: Frontal and lateral views of the left femur demonstrate generalized decreased bony mineralization. The crosstable view of the lower half of the femur slight limited secondary to overexposure limiting visualization of portions of the femoral cortex. AP view appears within normal limits. Impression:  Left hip intertrochanteric fracture  Slight limited visualization portions of the cortex lower femur as described above. Remainder of the left femur is unremarkable Ct Head Wo Cont Result Date: 3/27/2019 INDICATION: 45-year-old female status post fall with pain COMPARISON EXAMS: CT head dated 1/4/2011 FINDINGS: Axial imaging from skull base to vertex without intravenous contrast. Calvarium intact. Paranasal sinuses are clear. Globes and intraconal spaces are unremarkable.  No intracranial hemorrhage or acute large territorial infarct. Ventricles normal in size configuration with no midline shift. There is a mild degree of low-attenuation within periventricular white matter consistent appearance of chronic small vessel changes. This may result in slight limited evaluation for any subtle ischemia. No large acute territorial infarct. IMPRESSION: No acute intracranial process Assessment and Plan:  
 
Hospital Problems as of 3/27/2019 Date Reviewed: 3/5/2019 Codes Class Noted - Resolved POA Hip fracture, left ([facility] ICD-10-CM: E83.592K ICD-9-CM: 820.8  3/27/2019 - Present Unknown Peripheral polyneuropathy ICD-10-CM: G62.9 ICD-9-CM: 356.9  5/21/2018 - Present Yes Alzheimer's dementia without behavioral disturbance (Chronic) ICD-10-CM: G30.9, F02.80 ICD-9-CM: 331.0, 294.10  12/18/2017 - Present Yes Acquired hypothyroidism ICD-10-CM: E03.9 ICD-9-CM: 244.9  12/18/2017 - Present Yes Atrial fibrillation ([facility] ICD-10-CM: I48.91 
ICD-9-CM: 427.31  8/9/2013 - Present Yes HTN (hypertension) (Chronic) ICD-10-CM: I10 
ICD-9-CM: 401.9  11/15/2010 - Present Yes Plan: # Left intertrochanteric hip fracture - General mgmt per ortho 
 - PT/OT/PPD/CM # Hypothyroid - Synthroid # Neuropathy 
 - gabapentin, cymbalta # GERD 
 - PPI # Atrial fibrillation - Rate controlled on amiodarone - Unclear if she's on [address] [place] [address] Road or not. I called [facility] on [facility] and they have apparently not filled any meds for her since 2017, the rest she gets as a mail in. No OAC are listed on her outpatient PCP notes as far as I can tell. Patient's RCRI score is 1 and is at moderate risk of ACE for a moderate risk procedure. Discharge planning: PPD/CM/therapies. DVT ppx: SCDs, then SQ post-op. Code status:  Full Estimated LOS:  Greater than 2 midnights Risk:  high Signed: 
Chani Palmer MD

## 2019-03-28 ENCOUNTER — ANESTHESIA (OUTPATIENT)
Dept: SURGERY | Age: 84
DRG: 481 | End: 2019-03-28
Payer: MEDICARE

## 2019-03-28 ENCOUNTER — ANESTHESIA EVENT (OUTPATIENT)
Dept: SURGERY | Age: 84
DRG: 481 | End: 2019-03-28
Payer: MEDICARE

## 2019-03-28 ENCOUNTER — APPOINTMENT (OUTPATIENT)
Dept: GENERAL RADIOLOGY | Age: 84
DRG: 481 | End: 2019-03-28
Attending: ORTHOPAEDIC SURGERY
Payer: MEDICARE

## 2019-03-28 LAB
25(OH)D3+25(OH)D2 SERPL-MCNC: 90.5 NG/ML (ref 30–100)
ANION GAP SERPL CALC-SCNC: 6 MMOL/L (ref 7–16)
BUN SERPL-MCNC: 27 MG/DL (ref 8–23)
CALCIUM SERPL-MCNC: 8.4 MG/DL (ref 8.3–10.4)
CHLORIDE SERPL-SCNC: 109 MMOL/L (ref 98–107)
CO2 SERPL-SCNC: 28 MMOL/L (ref 21–32)
CREAT SERPL-MCNC: 1.05 MG/DL (ref 0.6–1)
GLUCOSE SERPL-MCNC: 113 MG/DL (ref 65–100)
MM INDURATION POC: NORMAL 0 (ref 0–5)
MM INDURATION POC: NORMAL MM (ref 0–5)
POTASSIUM SERPL-SCNC: 4.3 MMOL/L (ref 3.5–5.1)
PPD POC: NORMAL NEGATIVE
SODIUM SERPL-SCNC: 143 MMOL/L (ref 136–145)

## 2019-03-28 PROCEDURE — 76060000032 HC ANESTHESIA 0.5 TO 1 HR: Performed by: ORTHOPAEDIC SURGERY

## 2019-03-28 PROCEDURE — 77030012893

## 2019-03-28 PROCEDURE — C1769 GUIDE WIRE: HCPCS | Performed by: ORTHOPAEDIC SURGERY

## 2019-03-28 PROCEDURE — 74011250636 HC RX REV CODE- 250/636: Performed by: ORTHOPAEDIC SURGERY

## 2019-03-28 PROCEDURE — 77030002933 HC SUT MCRYL J&J -A: Performed by: ORTHOPAEDIC SURGERY

## 2019-03-28 PROCEDURE — 0QS704Z REPOSITION LEFT UPPER FEMUR WITH INTERNAL FIXATION DEVICE, OPEN APPROACH: ICD-10-PCS | Performed by: ORTHOPAEDIC SURGERY

## 2019-03-28 PROCEDURE — 36415 COLL VENOUS BLD VENIPUNCTURE: CPT

## 2019-03-28 PROCEDURE — 74011250636 HC RX REV CODE- 250/636

## 2019-03-28 PROCEDURE — C1713 ANCHOR/SCREW BN/BN,TIS/BN: HCPCS | Performed by: ORTHOPAEDIC SURGERY

## 2019-03-28 PROCEDURE — 77030020263 HC SOL INJ SOD CL0.9% LFCR 1000ML

## 2019-03-28 PROCEDURE — 76210000006 HC OR PH I REC 0.5 TO 1 HR: Performed by: ORTHOPAEDIC SURGERY

## 2019-03-28 PROCEDURE — 77030018836 HC SOL IRR NACL ICUM -A: Performed by: ORTHOPAEDIC SURGERY

## 2019-03-28 PROCEDURE — 65270000029 HC RM PRIVATE

## 2019-03-28 PROCEDURE — 76010000160 HC OR TIME 0.5 TO 1 HR INTENSV-TIER 1: Performed by: ORTHOPAEDIC SURGERY

## 2019-03-28 PROCEDURE — 97161 PT EVAL LOW COMPLEX 20 MIN: CPT

## 2019-03-28 PROCEDURE — 74011250636 HC RX REV CODE- 250/636: Performed by: NURSE PRACTITIONER

## 2019-03-28 PROCEDURE — 74011250637 HC RX REV CODE- 250/637: Performed by: ORTHOPAEDIC SURGERY

## 2019-03-28 PROCEDURE — 97530 THERAPEUTIC ACTIVITIES: CPT

## 2019-03-28 PROCEDURE — 74011250637 HC RX REV CODE- 250/637: Performed by: NURSE PRACTITIONER

## 2019-03-28 PROCEDURE — 80048 BASIC METABOLIC PNL TOTAL CA: CPT

## 2019-03-28 PROCEDURE — 73552 X-RAY EXAM OF FEMUR 2/>: CPT

## 2019-03-28 PROCEDURE — 77030020782 HC GWN BAIR PAWS FLX 3M -B: Performed by: ANESTHESIOLOGY

## 2019-03-28 PROCEDURE — 74011250637 HC RX REV CODE- 250/637: Performed by: INTERNAL MEDICINE

## 2019-03-28 PROCEDURE — 77030014405 HC GD ROD RMR SYNT -C: Performed by: ORTHOPAEDIC SURGERY

## 2019-03-28 PROCEDURE — 77030008467 HC STPLR SKN COVD -B: Performed by: ORTHOPAEDIC SURGERY

## 2019-03-28 DEVICE — NAIL IM L400MM DIA10MM 130DEG LNG L PROX FEM GRN TI CANN: Type: IMPLANTABLE DEVICE | Site: FEMUR | Status: FUNCTIONAL

## 2019-03-28 DEVICE — IMPLANTABLE DEVICE: Type: IMPLANTABLE DEVICE | Site: FEMUR | Status: FUNCTIONAL

## 2019-03-28 RX ORDER — VANCOMYCIN/0.9 % SOD CHLORIDE 750 MG/250
750 PLASTIC BAG, INJECTION (ML) INTRAVENOUS EVERY 24 HOURS
Status: COMPLETED | OUTPATIENT
Start: 2019-03-29 | End: 2019-03-29

## 2019-03-28 RX ORDER — MAG HYDROX/ALUMINUM HYD/SIMETH 200-200-20
30 SUSPENSION, ORAL (FINAL DOSE FORM) ORAL
Status: DISCONTINUED | OUTPATIENT
Start: 2019-03-28 | End: 2019-04-01 | Stop reason: HOSPADM

## 2019-03-28 RX ORDER — KETAMINE HYDROCHLORIDE 100 MG/ML
INJECTION, SOLUTION INTRAMUSCULAR; INTRAVENOUS AS NEEDED
Status: DISCONTINUED | OUTPATIENT
Start: 2019-03-28 | End: 2019-03-28 | Stop reason: HOSPADM

## 2019-03-28 RX ORDER — VANCOMYCIN/0.9 % SOD CHLORIDE 750 MG/250
750 PLASTIC BAG, INJECTION (ML) INTRAVENOUS
Status: COMPLETED | OUTPATIENT
Start: 2019-03-28 | End: 2019-03-28

## 2019-03-28 RX ORDER — CEFAZOLIN SODIUM/WATER 2 G/20 ML
2 SYRINGE (ML) INTRAVENOUS
Status: COMPLETED | OUTPATIENT
Start: 2019-03-28 | End: 2019-03-28

## 2019-03-28 RX ORDER — ONDANSETRON 2 MG/ML
4 INJECTION INTRAMUSCULAR; INTRAVENOUS
Status: DISCONTINUED | OUTPATIENT
Start: 2019-03-28 | End: 2019-04-01 | Stop reason: HOSPADM

## 2019-03-28 RX ORDER — SODIUM CHLORIDE 0.9 % (FLUSH) 0.9 %
5-40 SYRINGE (ML) INJECTION EVERY 8 HOURS
Status: DISCONTINUED | OUTPATIENT
Start: 2019-03-28 | End: 2019-04-01 | Stop reason: HOSPADM

## 2019-03-28 RX ORDER — SODIUM CHLORIDE 0.9 % (FLUSH) 0.9 %
5-40 SYRINGE (ML) INJECTION AS NEEDED
Status: DISCONTINUED | OUTPATIENT
Start: 2019-03-28 | End: 2019-04-01 | Stop reason: HOSPADM

## 2019-03-28 RX ORDER — PROPOFOL 10 MG/ML
INJECTION, EMULSION INTRAVENOUS AS NEEDED
Status: DISCONTINUED | OUTPATIENT
Start: 2019-03-28 | End: 2019-03-28 | Stop reason: HOSPADM

## 2019-03-28 RX ORDER — DOCUSATE SODIUM 100 MG/1
100 CAPSULE, LIQUID FILLED ORAL 2 TIMES DAILY
Status: DISCONTINUED | OUTPATIENT
Start: 2019-03-28 | End: 2019-04-01 | Stop reason: HOSPADM

## 2019-03-28 RX ORDER — OXYCODONE HYDROCHLORIDE 5 MG/1
5 TABLET ORAL
Status: DISCONTINUED | OUTPATIENT
Start: 2019-03-28 | End: 2019-03-28

## 2019-03-28 RX ORDER — FERROUS SULFATE, DRIED 160(50) MG
1 TABLET, EXTENDED RELEASE ORAL
Status: DISCONTINUED | OUTPATIENT
Start: 2019-03-28 | End: 2019-04-01 | Stop reason: HOSPADM

## 2019-03-28 RX ORDER — ENOXAPARIN SODIUM 100 MG/ML
30 INJECTION SUBCUTANEOUS EVERY 24 HOURS
Status: DISCONTINUED | OUTPATIENT
Start: 2019-03-29 | End: 2019-04-01 | Stop reason: HOSPADM

## 2019-03-28 RX ORDER — TRAMADOL HYDROCHLORIDE 50 MG/1
50 TABLET ORAL
Status: DISCONTINUED | OUTPATIENT
Start: 2019-03-28 | End: 2019-04-01 | Stop reason: HOSPADM

## 2019-03-28 RX ORDER — ACETAMINOPHEN 10 MG/ML
INJECTION, SOLUTION INTRAVENOUS AS NEEDED
Status: DISCONTINUED | OUTPATIENT
Start: 2019-03-28 | End: 2019-03-28 | Stop reason: HOSPADM

## 2019-03-28 RX ORDER — OXYCODONE HYDROCHLORIDE 5 MG/1
5 TABLET ORAL
Status: DISCONTINUED | OUTPATIENT
Start: 2019-03-28 | End: 2019-04-01 | Stop reason: HOSPADM

## 2019-03-28 RX ORDER — SODIUM CHLORIDE, SODIUM LACTATE, POTASSIUM CHLORIDE, CALCIUM CHLORIDE 600; 310; 30; 20 MG/100ML; MG/100ML; MG/100ML; MG/100ML
75 INJECTION, SOLUTION INTRAVENOUS CONTINUOUS
Status: DISCONTINUED | OUTPATIENT
Start: 2019-03-28 | End: 2019-03-31

## 2019-03-28 RX ORDER — SODIUM CHLORIDE, SODIUM LACTATE, POTASSIUM CHLORIDE, CALCIUM CHLORIDE 600; 310; 30; 20 MG/100ML; MG/100ML; MG/100ML; MG/100ML
INJECTION, SOLUTION INTRAVENOUS
Status: DISCONTINUED | OUTPATIENT
Start: 2019-03-28 | End: 2019-03-28 | Stop reason: HOSPADM

## 2019-03-28 RX ORDER — SODIUM CHLORIDE, SODIUM LACTATE, POTASSIUM CHLORIDE, CALCIUM CHLORIDE 600; 310; 30; 20 MG/100ML; MG/100ML; MG/100ML; MG/100ML
75 INJECTION, SOLUTION INTRAVENOUS
Status: COMPLETED | OUTPATIENT
Start: 2019-03-28 | End: 2019-03-28

## 2019-03-28 RX ORDER — ACETAMINOPHEN 325 MG/1
650 TABLET ORAL EVERY 8 HOURS
Status: DISCONTINUED | OUTPATIENT
Start: 2019-03-28 | End: 2019-04-01 | Stop reason: HOSPADM

## 2019-03-28 RX ADMIN — PROPOFOL 10 MG: 10 INJECTION, EMULSION INTRAVENOUS at 13:34

## 2019-03-28 RX ADMIN — PROPOFOL 30 MG: 10 INJECTION, EMULSION INTRAVENOUS at 13:20

## 2019-03-28 RX ADMIN — SODIUM CHLORIDE, SODIUM LACTATE, POTASSIUM CHLORIDE, CALCIUM CHLORIDE: 600; 310; 30; 20 INJECTION, SOLUTION INTRAVENOUS at 12:57

## 2019-03-28 RX ADMIN — SODIUM CHLORIDE, SODIUM LACTATE, POTASSIUM CHLORIDE, AND CALCIUM CHLORIDE 75 ML/HR: 600; 310; 30; 20 INJECTION, SOLUTION INTRAVENOUS at 17:23

## 2019-03-28 RX ADMIN — Medication 1 AMPULE: at 21:48

## 2019-03-28 RX ADMIN — DULOXETINE 60 MG: 60 CAPSULE, DELAYED RELEASE ORAL at 09:36

## 2019-03-28 RX ADMIN — PROPOFOL 10 MG: 10 INJECTION, EMULSION INTRAVENOUS at 13:30

## 2019-03-28 RX ADMIN — PRAVASTATIN SODIUM 40 MG: 20 TABLET ORAL at 21:47

## 2019-03-28 RX ADMIN — ACETAMINOPHEN 650 MG: 325 TABLET, FILM COATED ORAL at 21:48

## 2019-03-28 RX ADMIN — Medication 1 AMPULE: at 09:37

## 2019-03-28 RX ADMIN — SODIUM CHLORIDE, SODIUM LACTATE, POTASSIUM CHLORIDE, AND CALCIUM CHLORIDE 75 ML/HR: 600; 310; 30; 20 INJECTION, SOLUTION INTRAVENOUS at 12:06

## 2019-03-28 RX ADMIN — VANCOMYCIN HYDROCHLORIDE 750 MG: 10 INJECTION, POWDER, LYOPHILIZED, FOR SOLUTION INTRAVENOUS at 12:16

## 2019-03-28 RX ADMIN — Medication 5 ML: at 17:23

## 2019-03-28 RX ADMIN — PANTOPRAZOLE SODIUM 40 MG: 40 TABLET, DELAYED RELEASE ORAL at 05:03

## 2019-03-28 RX ADMIN — Medication 2 G: at 13:16

## 2019-03-28 RX ADMIN — AMIODARONE HYDROCHLORIDE 200 MG: 200 TABLET ORAL at 09:36

## 2019-03-28 RX ADMIN — PROPOFOL 1 MG: 10 INJECTION, EMULSION INTRAVENOUS at 13:12

## 2019-03-28 RX ADMIN — Medication 1 AMPULE: at 09:36

## 2019-03-28 RX ADMIN — KETAMINE HYDROCHLORIDE 10 MG: 100 INJECTION, SOLUTION INTRAMUSCULAR; INTRAVENOUS at 13:15

## 2019-03-28 RX ADMIN — PROPOFOL 10 MG: 10 INJECTION, EMULSION INTRAVENOUS at 13:26

## 2019-03-28 RX ADMIN — PROPOFOL 20 MG: 10 INJECTION, EMULSION INTRAVENOUS at 13:15

## 2019-03-28 RX ADMIN — OXYCODONE HYDROCHLORIDE 5 MG: 5 TABLET ORAL at 18:15

## 2019-03-28 RX ADMIN — GABAPENTIN 300 MG: 300 CAPSULE ORAL at 21:48

## 2019-03-28 RX ADMIN — DONEPEZIL HYDROCHLORIDE 10 MG: 5 TABLET, FILM COATED ORAL at 21:48

## 2019-03-28 RX ADMIN — KETAMINE HYDROCHLORIDE 10 MG: 100 INJECTION, SOLUTION INTRAMUSCULAR; INTRAVENOUS at 13:19

## 2019-03-28 RX ADMIN — LEVOTHYROXINE SODIUM 100 MCG: 100 TABLET ORAL at 05:03

## 2019-03-28 RX ADMIN — CALCIUM CARBONATE 500 MG (1,250 MG)-VITAMIN D3 200 UNIT TABLET 1 TABLET: at 17:22

## 2019-03-28 RX ADMIN — DULOXETINE 60 MG: 60 CAPSULE, DELAYED RELEASE ORAL at 17:22

## 2019-03-28 RX ADMIN — DOCUSATE SODIUM 100 MG: 100 CAPSULE, LIQUID FILLED ORAL at 17:22

## 2019-03-28 RX ADMIN — PROPOFOL 20 MG: 10 INJECTION, EMULSION INTRAVENOUS at 13:04

## 2019-03-28 RX ADMIN — Medication 10 ML: at 21:48

## 2019-03-28 RX ADMIN — ACETAMINOPHEN 650 MG: 325 TABLET, FILM COATED ORAL at 05:02

## 2019-03-28 RX ADMIN — KETAMINE HYDROCHLORIDE 20 MG: 100 INJECTION, SOLUTION INTRAMUSCULAR; INTRAVENOUS at 13:03

## 2019-03-28 RX ADMIN — ACETAMINOPHEN 650 MG: 325 TABLET, FILM COATED ORAL at 17:22

## 2019-03-28 RX ADMIN — MONTELUKAST SODIUM 10 MG: 10 TABLET, FILM COATED ORAL at 09:36

## 2019-03-28 RX ADMIN — GABAPENTIN 300 MG: 300 CAPSULE ORAL at 05:02

## 2019-03-28 RX ADMIN — HYDROMORPHONE HYDROCHLORIDE 0.5 MG: 1 INJECTION, SOLUTION INTRAMUSCULAR; INTRAVENOUS; SUBCUTANEOUS at 05:03

## 2019-03-28 RX ADMIN — PROPOFOL 10 MG: 10 INJECTION, EMULSION INTRAVENOUS at 13:09

## 2019-03-28 RX ADMIN — TRAMADOL HYDROCHLORIDE 50 MG: 50 TABLET, COATED ORAL at 21:48

## 2019-03-28 RX ADMIN — ACETAMINOPHEN 1000 MG: 10 INJECTION, SOLUTION INTRAVENOUS at 13:45

## 2019-03-28 NOTE — PROGRESS NOTES
CM attempted to complete consult for discharge planning. Per chart patient has dementia at baseline. No family at bedside at time of attempt. CM attempted phone contact to daughter, Jean Christopher, but number has been disconnected ([phone]). CM attempted phone contact x3 to daughterLeila ([phone]), and received busy tone. CM attempted phone contact x2 to sister, Ni Rizzo ([phone]), and left a voice message requesting returned contact. CM will leave a SNF list in patient's room for patient's family to review when they are available. CM will reattempt consult / assessment when family is available. Anticipate STR to be needed after discharge.

## 2019-03-28 NOTE — ANESTHESIA PREPROCEDURE EVALUATION
Relevant Problems No relevant active problems Anesthetic History Review of Systems / Medical History Patient summary reviewed and pertinent labs reviewed Pulmonary Neuro/Psych Dementia (alert, oriented x 0) Cardiovascular Hypertension Dysrhythmias : atrial fibrillation Hyperlipidemia Exercise tolerance: <4 METS Comments: 45% of EF in echo 2013  Normal-donita valves. GI/Hepatic/Renal 
  
 
 
 
 
 
 Endo/Other Hypothyroidism Arthritis Other Findings Physical Exam 
 
Airway Mallampati: II 
TM Distance: 4 - 6 cm Neck ROM: normal range of motion Mouth opening: Normal 
 
 Cardiovascular Regular rate and rhythm,  S1 and S2 normal,  no murmur, click, rub, or gallop Dental 
 
Dentition: Edentulous Pulmonary Breath sounds clear to auscultation Abdominal 
GI exam deferred Other Findings Anesthetic Plan ASA: 3 Anesthesia type: total IV anesthesia Induction: Intravenous Anesthetic plan and risks discussed with: Patient and Son / Daughter Daughter present Spinal will not be attempted 2/2 patient on unknown [address] per notes.

## 2019-03-28 NOTE — PROGRESS NOTES
Spiritual Care Visit, initial visit. Patient was not in her room. [place] later that she is in surgery. Visit by Reji Ramos Me, Staff .  Kala., Harm[place].BENOIT., B.A.

## 2019-03-28 NOTE — CONSULTS
ORTHO:    PATIENT SCHEDULED FOR SURGERY 3/28/19 WITH DR Dede Sims - OPEN REDUCTION INTERNAL FIXATION OF LEFT HIP

## 2019-03-28 NOTE — PROGRESS NOTES
Attempted to call children, sister and cell phone listed in chart X 5. Phone numbers for children do not work. Patient personal number not being answered. Sister's phone is off.

## 2019-03-28 NOTE — PROGRESS NOTES
Problem: Mobility Impaired (Adult and Pediatric) Goal: *Acute Goals and Plan of Care (Insert Text) Description STG: 
(1.)Ms. Helen Beasley will move from supine to sit and sit to supine  with CONTACT GUARD ASSIST within 3 treatment day(s). (2.)Ms. Helen Beasley will transfer from bed to chair and chair to bed with CONTACT GUARD ASSIST using the least restrictive device within 3 treatment day(s). (3.)Ms. Helen Beasley will ambulate with CONTACT GUARD ASSIST for 30 feet with the least restrictive device within 3 treatment day(s). LTG: 
(1.)Ms. Helen Beasley will move from supine to sit and sit to supine  in bed with STAND BY ASSIST within 7 treatment day(s). (2.)Ms. Helen Beasley will transfer from bed to chair and chair to bed with STAND BY ASSIST using the least restrictive device within 7 treatment day(s). (3.)Ms. Helen Beasley will ambulate with STAND BY ASSIST for 100+ feet with the least restrictive device within 7 treatment day(s). ________________________________________________________________________________________________ Outcome: Progressing Towards Goal 
 
 
PHYSICAL THERAPY: Initial Assessment and PM 3/28/2019 INPATIENT: PT Visit Days : 1 Payor: [facility] [place] MEDICARE / Plan: [employer] [place]/PPO / Product Type: Managed Care Medicare /   
  
NAME/AGE/GENDER: Romi Choudhury is a 80 y.o. female PRIMARY DIAGNOSIS: Hip fracture, left ([facility] [address] [S72.002A] Hip fracture, left ([facility] [address] Hip fracture, left ([facility] [address] Procedure(s) (LRB): LEFT FEMUR INSERTION INTRA MEDULLARY NAIL (Left) Day of Surgery ICD-10: Treatment Diagnosis:  
 Generalized Muscle Weakness (M62.81) Difficulty in walking, Not elsewhere classified (R26.2) Repeated Falls (R29.6) History of falling (Z91.81) Precaution/Allergies: 
Patient has no known allergies. WBAT L LE  
ASSESSMENT:  
 
Ms. Helen Beasley is supine in bed upon contact and agreeable to PT evaluation and treatment. Pt is A&O X 1 this session.  Pt reports soreness at L hip region post op. Pt with history of baseline dementia. Pt lives alone in 1 story home with 2 steps to enter. Pt is independent with gait and ADLs, history of falls, and drives. Pt currently on O2 NC post op but does not require supplemental O2 at baseline. Pt transitioned supine to sit EOB with Yara and cues for technique. Pt demonstrates good static sitting balance. Pt performed STS to RW with Yara and ambulated 3 ft to bedside chair with RW and CGA-Yara. Pt required cues for improved step length for adequate foot clearance. Pt ambulates with narrowed SOLANGE and shuffling gait pattern with decreased L stance time. Pt returned to sitting with Yara for controlled descent to chair and able to scoot self back in chair. Pt left sitting up with all needs met and within reach. Nursing notified. Ayesha Brasher will benefit from skilled PT (medically necessary) to address decreased strength, decreased balance, decreased functional tolerance, decreased cardiopulmonary endurance affecting participation in basic ADLs and functional tasks. This section established at most recent assessment PROBLEM LIST (Impairments causing functional limitations): 
Decreased Strength Decreased ADL/Functional Activities Decreased Transfer Abilities Decreased Ambulation Ability/Technique Decreased Balance Increased Pain Decreased Flexibility/Joint Mobility Decreased Knowledge of Precautions Decreased [place] with Home Exercise Program 
Decreased Cognition INTERVENTIONS PLANNED: (Benefits and precautions of physical therapy have been discussed with the patient.) Balance Exercise Bed Mobility Family Education Gait Training Home Exercise Program (HEP) Neuromuscular Re-education/Strengthening Range of Motion (ROM) Therapeutic Activites Therapeutic Exercise/Strengthening Transfer Training TREATMENT PLAN: Frequency/Duration: twice daily for duration of hospital stay Rehabilitation Potential For Stated Goals: Good RECOMMENDED REHABILITATION/EQUIPMENT: (at time of discharge pending progress): Due to the probability of continued deficits (see above) this patient will likely need continued skilled physical therapy after discharge. Equipment:  
None at this time HISTORY:  
History of Present Injury/Illness (Reason for Referral): S/p LEFT FEMUR INSERTION INTRA MEDULLARY NAIL Past Medical History/Comorbidities: Ms. Franck Short  has a past medical history of Arthritis, Atrial fibrillation ([address] Dementia, Depression, Heart failure ([address] Hyperlipidemia, Hypertension, Hypothyroidism, and Other ill-defined conditions(799.89). Ms. Franck Short  has a past surgical history that includes hx orthopaedic; hx cholecystectomy; hx tubal ligation; hx hysterectomy; hx tonsillectomy; and hx small bowel resection (8/8/13). Social History/Living Environment:  
Home Environment: Private residence # Steps to Enter: 2 Rails to Enter: Yes One/Two Story Residence: One story Living Alone: Yes Support Systems: Family member(s) Patient Expects to be Discharged to[de-identified] Rehabilitation facility Current DME Used/Available at Home: Kory Scott, [address] [facility] Prior Level of Function/Work/Activity: 
Independent, drives, history of falling Number of Personal Factors/Comorbidities that affect the Plan of Care: 3+: HIGH COMPLEXITY EXAMINATION:  
Most Recent Physical Functioning:  
Gross Assessment: 
AROM: Generally decreased, functional 
Strength: Generally decreased, functional 
Coordination: Generally decreased, functional 
         
  
Posture: 
  
Balance: 
Sitting: Intact; Without support Standing: Impaired;Pull to stand; With support Bed Mobility: 
Supine to Sit: Minimum assistance Wheelchair Mobility: 
  
Transfers: 
Sit to Stand: Minimum assistance Stand to Sit: Minimum assistance Gait: 
Left Side Weight Bearing: As tolerated Base of Support: Narrowed; Shift to right Speed/Ashley: Slow;Shuffled Step Length: Right shortened;Left shortened Stance: Left decreased Gait Abnormalities: Antalgic;Decreased step clearance;Shuffling gait Distance (ft): 5 Feet (ft) Assistive Device: Walker, rolling Ambulation - Level of Assistance: Minimal assistance Interventions: Safety awareness training; Tactile cues; Verbal cues Body Structures Involved: 
Nerves Bones Joints Muscles Ligaments Body Functions Affected: 
Mental 
Sensory/Pain Cardio Respiratory Neuromusculoskeletal 
Movement Related Activities and Participation Affected: 
Learning and Applying Knowledge General Tasks and Demands Mobility Self Care Domestic Life Interpersonal Interactions and Relationships Community, Social and [place] Number of elements that affect the Plan of Care: 4+: HIGH COMPLEXITY CLINICAL PRESENTATION:  
Presentation: Evolving clinical presentation with changing clinical characteristics: MODERATE COMPLEXITY CLINICAL DECISION MA[address]IN[address] [place] [address] AM-PAC? ?6 Clicks? Basic Mobility Inpatient Short Form How much difficulty does the patient currently have. .. Unable A Lot A Little None 1. Turning over in bed (including adjusting bedclothes, sheets and blankets)? ? 1   ? 2   ? 3   ? 4  
2. Sitting down on and standing up from a chair with arms ( e.g., wheelchair, bedside commode, etc.)   ? 1   ? 2   ? 3   ? 4  
3. Moving from lying on back to sitting on the side of the bed?   ? 1   ? 2   ? 3   ? 4 How much help from another person does the patient currently need. .. Total A Lot A Little None 4. Moving to and from a bed to a chair (including a wheelchair)? ? 1   ? 2   ? 3   ? 4  
5. Need to walk in hospital room? ? 1   ? 2   ? 3   ? 4  
6. Climbing 3-5 steps with a railing? ? 1   ? 2   ? 3   ? 4  
© 2007, Trustees of [address] [place] [address], under license to [employer]. All rights reserved Score:  Initial: 16 Most Recent: X (Date: -- ) Interpretation of Tool:  Represents activities that are increasingly more difficult (i.e. Bed mobility, Transfers, Gait). Medical Necessity:    
Patient is expected to demonstrate progress in strength, range of motion, balance, coordination and functional technique 
 to decrease assistance required with gait, transfers, and functional mobility. . 
Reason for Services/Other Comments: 
Patient continues to require skilled intervention due to decreased strength, decreased balance, decreased functional tolerance, decreased cardiopulmonary endurance affecting participation in basic ADLs and functional tasks Kody Hendrickson Use of outcome tool(s) and clinical judgement create a POC that gives a: Clear prediction of patient's progress: LOW COMPLEXITY  
  
 
 
 
TREATMENT:  
(In addition to Assessment/Re-Assessment sessions the following treatments were rendered) Pre-treatment Symptoms/Complaints:  L LE \"soreness\" Pain: Initial:  
Pain Intensity 1: 0  Post Session:  Increased with WBing 2/10, visual  
 
Therapeutic Activity: (    10 minutes): Therapeutic activities including Bed transfers, Chair transfers, Ambulation on level ground and cues for ease and safety of transfers  to improve mobility, strength, balance and coordination. Required minimal Safety awareness training; Tactile cues; Verbal cues to promote static and dynamic balance in standing and promote coordination of bilateral, upper extremity(s), lower extremity(s). Braces/Orthotics/Lines/Etc:  
IV 
estes catheter O2 Device: Nasal cannula Treatment/Session Assessment:   
Response to Treatment:  bed to chair with RW and Yara Interdisciplinary Collaboration:  
Physical Therapist 
Registered Nurse Certified Nursing Assistant/Patient Care Technician SPT After treatment position/precautions:  
Up in chair Bed alarm/tab alert on Bed/Chair-wheels locked Bed in low position Call light within reach RN notified Compliance with Program/Exercises: Will assess as treatment progresses Recommendations/Intent for next treatment session: \"Next visit will focus on advancements to more challenging activities and reduction in assistance provided\". Total Treatment Duration: PT Patient Time In/Time Out Time In: 7308 Time Out: 6445 [address]

## 2019-03-28 NOTE — PROGRESS NOTES
Fracture coordinator notified of saturation of top surgical site through dressing. Given orders to reinforce.

## 2019-03-28 NOTE — PROGRESS NOTES
TRANSFER - IN REPORT: 
 
Verbal report received from Jake Hand on Yamileth Peter  being received from Pre-Op for ordered procedure Report consisted of patients Situation, Background, Assessment and  
Recommendations(SBAR). Information from the following report(s) SBAR, Kardex, Intake/Output, MAR, Accordion and Recent Results was reviewed with the receiving nurse. Opportunity for questions and clarification was provided. Assessment completed upon patients arrival to unit and care assumed.

## 2019-03-28 NOTE — PROGRESS NOTES
OT orders received, chart reviewed. Patient is scheduled for surgery later today with Dr. Placido Chapman. Will hold OT evaluation this morning and await further orders from ortho prior to mobilizing patient.   
Laura Acosta, OTR/L

## 2019-03-28 NOTE — PROGRESS NOTES
CM obtained a better contact number for patient's daughter, Chelita Joseph ([phone]), from the board in patient's room, and updated patient's chart. Chris Srinivasan" also provided telephone number for patient's son, Sheyla Betancur [phone], who lives in [facility]. All assessment information obtained from daughter via telephone. SOCIAL: 
Patient has been living alone for the past 5 years in her 1 level home with 0 steps inside of the home, and 2 steps at the entrance with hand rail. Walk-in shower with grab bars. Her daughter, Elroy Thomson lives 15 minutes away. Elroy Thomson drives, but is disabled. Elroy Thomson has CLTC for herself to assist in her care at home. Patient's son Mamadou Milan lives in [facility], and is reportedly in good health, per Elroy Thomson. No other social support at this time. Patient is not a . Her insurance is confirmed as [employer] [place] Medicare. Daughter confirmed patient does have a supplemental policy. RX are reportedly affordable. No history of mental health or substance abuse issues or treatment. PCP is confirmed as Dr. Roxi Huber. Last visit 1 week ago. Daughter reports she was informed at this appointment that patient has \"some dementia\". Source of income is social security. MOBILITY / HISTORY: At baseline, daughter reports that patient has been ambulating without devices. She has had some falls, but number of falls cannot be determined. She reportedly manages her own ADLs with independence including dressing, showering, toileting, cooking, cleaning, and managing medications. Patient was driving prior to admission, per daughter. DME - cane, rollator. Patient does not use these devices. No home oxygen. No dialysis history. No privately paid sitters, aids, caregivers, or CLTC hours. STR history at [facility] uncertain of how long ago. History of [place] with unknown company, per daughter. BARRIERS: 
Patient has dementia / cognition limitations. Limited social support available. Daughter is disabled / physically limited; will likely have difficulty providing care for patient. Son lives out of local area. STRENGTHS: 
Patient has primary and supplemental insurance policies. Daughter is willing for patient to move into her home after discharge from [address] [place] [address]. PLAN FOR DISCHARGE: 
Planning discharge to STR. SNF list placed in patient's room, and daughter has agreed to provide CM with her choices for STR referrals. Care Management Interventions PCP Verified by CM: Yes Mode of Transport at Discharge: [facility] Transition of Care Consult (CM Consult): Discharge Planning, SNF(STR) Discharge Durable Medical Equipment: No 
Physical Therapy Consult: Yes Occupational Therapy Consult: Yes Speech Therapy Consult: Yes Current Support Network: Own Home, Lives Alone, Family Lives Nearby(Daughter lives 15 mintues away) Confirm Follow Up Transport: Family Plan discussed with Pt/Family/Caregiver: Yes(Spoke with daughter via telephone. ) Freedom of Choice Offered: Yes Discharge Location Discharge Placement: Home

## 2019-03-28 NOTE — OP NOTES
[facility]  OPERATIVE REPORT    Name:  Roxane Solares  MR#:  [id number]  :  1933  ACCOUNT #:  [de-identified]  DATE OF SERVICE:  2019    PREOPERATIVE DIAGNOSIS:  Left intertrochanteric hip fracture. POSTOPERATIVE DIAGNOSIS:  Left intertrochanteric hip fracture. PROCEDURE PERFORMED:  Open reduction and internal fixation of left intertrochanteric hip fracture with intramedullary nail. SURGEON:  Travis Prado MD    ASSISTANT:  NONE    ANESTHESIA:  Spinal.    COMPLICATIONS:  None. SPECIMENS REMOVED:  NONE. IMPLANTS:  SEE BRIEF OP NOTE    ESTIMATED BLOOD LOSS:  50 mL. FLUIDS:  See Anesthesia record. CLOSURE:  Primary. PROCEDURE:  The patient brought to the operative suite, placed in supine position. After adequate anesthesia achieved in the form of spinal, the patient was placed upon the fracture table. Perineal post and foot holes were well padded. The left hip was reduced with longitudinal traction, adduction, and internal rotation. The left hip was then prepped and draped in usual sterile fashion. 3 cm incision was made over the tip of the trochanter. Hemostasis was achieved with Bovie cautery. Guidewire for the Synthes trochanteric fixation nail advanced. It was inserted through this tip of the trochanter into the canal of the femur. Its position was confirmed by fluoroscopy. Proximal reamer was then passed by hand over this guidewire to open up the proximal portal.  These were then removed. A ball-tip guidewire was inserted through this portal down to the epiphyseal scar of the knee. The depth gauge was used to determine the appropriate length nail. The 11 mm reamer was passed by hand to determine the appropriate diameter nail. It shattered, so we went ahead and reamed 11, 11.5, and 12 and chose the Synthes trochanteric fixation nail and advanced 10 mm x 130 degree x 400 mm left. It was inserted over the ball-tip guidewire without difficulty. Ball-tip guidewire was removed, and a targeting guide was inserted through a stab wound in the lateral aspect of the proximal femur. Guidewire was then inserted through this targeting guide through the lateral cortex, neck, and head stopping just short of subchondral bone center-center on AP and lateral fluoroscopic images. Depth gauge was used to determine the appropriate length nail. Reamers were passed over the guidewire to open up the lateral cortex, neck, and head. The 110 mm fenestrated helical blade was then tapped over this guidewire with excellent purchase in the head, again stopping just short of subchondral bone. The set screw was passed to a position of being dynamic from above. The compression device was used to compress the fracture to a stable position. Targeting guide was removed. AP and lateral fluoroscopic images confirmed the fracture was anatomically reduced and hardware was in good position. The wounds were irrigated with normal saline and closed in layers. Sterile compressive dressing was applied. The patient was removed from the fracture table and transferred to the recovery room alert and oriented under the care of Anesthesia.       Cabrera Cardona MD      [email or web address]  D:  03/28/2019 13:40  T:  03/28/2019 13:42  JOB #:  [id number]

## 2019-03-28 NOTE — PROGRESS NOTES
03/28/19 1600 Dual Skin Pressure Injury Assessment Dual Skin Pressure Injury Assessment WDL Second Care Provider (Based on [address] [place] [address]) Gavin Benitez RN Skin Integumentary Skin Integumentary (WDL) X Skin Color Appropriate for ethnicity Skin Condition/Temp Warm;Dry Skin Integrity Incision (comment) (Incision: LLE) Turgor Epidermis thin w/ loss of subcut tissue Wound Prevention and Protection Methods Orientation of Wound Prevention Posterior Location of Wound Prevention Sacrum/Coccyx Dressing Present  No  
Wound Offloading (Prevention Methods) Bed, pressure redistribution/air;Bed, pressure reduction mattress;Pillows;Repositioning;Turning

## 2019-03-28 NOTE — PROGRESS NOTES
Hospitalist Progress Note Admit Date:  3/27/2019 12:41 PM  
Name:  Onofre Becerril Age:  80 y.o. 
:  1933 MRN:  [id number] PCP:  Chey Boss MD 
Treatment Team: Attending Provider: Bettye Uriarte MD; Consulting Provider: Cynthia Caal MD; Utilization Review: Tatum Sanchez Subjective:  
 
Pt is a 81 yo female with pmh A Fib, dementia, chronic pain who was admitted  with left hip fx. She is scheduled for ORIF later today per [facility]. Rate controlled on monitor, vitals stable. Unclear if pt has been on anticoagulation. This morning pt is drowsy. Denies pain while at rest. Only oriented to self. Objective:  
 
Patient Vitals for the past 24 hrs: 
 Temp Pulse Resp BP SpO2  
19 0800 97.8 °F (36.6 °C) 75 18 152/69 91 %  
19 0429 97.4 °F (36.3 °C) 77 16 147/75 96 % 19 0040 97.6 °F (36.4 °C) 78 18 124/72 94 % 19 2000 98.2 °F (36.8 °C) 82 20 107/56 100 % 19 1500 97.4 °F (36.3 °C) 60 18 109/66 96 % 19 1250     97 % 19 1245  67   98 % 19 1242    154/71   
19 1240 97.5 °F (36.4 °C)  16 154/71  Oxygen Therapy O2 Sat (%): 91 % (19 0800) Pulse via Oximetry: 70 beats per minute (19 1250) O2 Device: Room air (19 1240) Intake/Output Summary (Last 24 hours) at 3/28/2019 1101 Last data filed at 3/28/2019 6152 Gross per 24 hour Intake  Output 475 ml Net -475 ml General:    Well nourished. Alert. CV:   RRR. No murmur, rub, or gallop. Lungs:   CTAB. No wheezing, rhonchi, or rales. Abdomen:   Soft, nontender, nondistended. Extremities: Warm and dry. No cyanosis or edema. Skin:     No rashes or jaundice. Neuro:  No gross focal deficits Data Review: 
I have reviewed all labs, meds, telemetry events, and studies from the last 24 hours: 
 
Recent Results (from the past 24 hour(s)) TYPE & SCREEN  Collection Time: 19 12:47 PM  
 Result Value Ref Range Crossmatch Expiration 03/30/2019 ABO/Rh(D) O POSITIVE Antibody screen NEG PREALBUMIN Collection Time: 03/27/19 12:47 PM  
Result Value Ref Range Prealbumin 23.5 18.0 - 35.7 MG/DL  
VITAMIN D, 25 HYDROXY Collection Time: 03/27/19 12:47 PM  
Result Value Ref Range Vitamin D 25-Hydroxy 90.5 30.0 - 100.0 ng/mL PTH INTACT Collection Time: 03/27/19 12:47 PM  
Result Value Ref Range Calcium 8.4 8.3 - 10.4 MG/DL  
 PTH, Intact 61.7 18.5 - 88.0 pg/mL CBC WITH AUTOMATED DIFF Collection Time: 03/27/19 12:48 PM  
Result Value Ref Range WBC 12.7 (H) 4.3 - 11.1 K/uL  
 RBC 2.92 (L) 4.05 - 5.2 M/uL HGB 9.7 (L) 11.7 - 15.4 g/dL HCT 30.7 (L) 35.8 - 46.3 % .1 (H) 79.6 - 97.8 FL  
 MCH 33.2 (H) 26.1 - 32.9 PG  
 MCHC 31.6 31.4 - 35.0 g/dL  
 RDW 12.2 11.9 - 14.6 % PLATELET 010 403 - 004 K/uL MPV 10.2 9.4 - 12.3 FL ABSOLUTE NRBC 0.00 0.0 - 0.2 K/uL  
 DF AUTOMATED NEUTROPHILS 81 (H) 43 - 78 % LYMPHOCYTES 8 (L) 13 - 44 % MONOCYTES 11 4.0 - 12.0 % EOSINOPHILS 0 (L) 0.5 - 7.8 % BASOPHILS 0 0.0 - 2.0 % IMMATURE GRANULOCYTES 1 0.0 - 5.0 %  
 ABS. NEUTROPHILS 10.2 (H) 1.7 - 8.2 K/UL  
 ABS. LYMPHOCYTES 1.0 0.5 - 4.6 K/UL  
 ABS. MONOCYTES 1.4 (H) 0.1 - 1.3 K/UL  
 ABS. EOSINOPHILS 0.0 0.0 - 0.8 K/UL  
 ABS. BASOPHILS 0.0 0.0 - 0.2 K/UL  
 ABS. IMM. GRANS. 0.1 0.0 - 0.5 K/UL METABOLIC PANEL, COMPREHENSIVE Collection Time: 03/27/19 12:48 PM  
Result Value Ref Range Sodium 144 136 - 145 mmol/L Potassium 4.0 3.5 - 5.1 mmol/L Chloride 108 (H) 98 - 107 mmol/L  
 CO2 30 21 - 32 mmol/L Anion gap 6 (L) 7 - 16 mmol/L Glucose 126 (H) 65 - 100 mg/dL BUN 24 (H) 8 - 23 MG/DL Creatinine 1.03 (H) 0.6 - 1.0 MG/DL  
 GFR est AA >60 >60 ml/min/1.73m2 GFR est non-AA 54 (L) >60 ml/min/1.73m2 Calcium 8.3 8.3 - 10.4 MG/DL  Bilirubin, total 0.5 0.2 - 1.1 MG/DL  
 ALT (SGPT) 30 12 - 65 U/L  
 AST (SGOT) 34 15 - 37 U/L  
 Alk. phosphatase 69 50 - 136 U/L Protein, total 5.8 (L) 6.3 - 8.2 g/dL Albumin 3.2 3.2 - 4.6 g/dL Globulin 2.6 2.3 - 3.5 g/dL A-G Ratio 1.2 1.2 - 3.5 PTT Collection Time: 03/27/19  2:28 PM  
Result Value Ref Range aPTT 29.2 24.7 - 39.8 SEC PROTHROMBIN TIME + INR Collection Time: 03/27/19  2:28 PM  
Result Value Ref Range Prothrombin time 13.4 11.7 - 14.5 sec INR 1.0 MSSA/MRSA SC BY PCR, NASAL SWAB Collection Time: 03/27/19  3:30 PM  
Result Value Ref Range Special Requests: NO SPECIAL REQUESTS Culture result: (A) MRSA target DNA detected, SA target DNA detected. A positive test result does not necessarily indicate the presence of viable organisms. It is however, presumptive for the presence of MRSA or SA. Culture result: RESULTS VERIFIED, PHONED TO AND READ BACK BY 
Be Smith RN @2212  3/27/19 URINALYSIS W/ RFLX MICROSCOPIC Collection Time: 03/27/19  3:43 PM  
Result Value Ref Range Color MARIVEL Appearance TURBID Specific gravity 1.028 (H) 1.001 - 1.023    
 pH (UA) 5.5 5.0 - 9.0 Protein NEGATIVE  NEG mg/dL Glucose NEGATIVE  mg/dL Ketone NEGATIVE  NEG mg/dL Bilirubin NEGATIVE  NEG Blood NEGATIVE  NEG Urobilinogen 0.2 0.2 - 1.0 EU/dL Nitrites NEGATIVE  NEG Leukocyte Esterase TRACE (A) NEG    
 WBC 3-5 0 /hpf  
 RBC 5-10 0 /hpf Epithelial cells 3-5 0 /hpf Bacteria TRACE 0 /hpf Casts HYALINE 0 /lpf Crystals, urine CA OXALATE 0 /LPF Mucus TRACE 0 /lpf Other observations RESULTS VERIFIED MANUALLY METABOLIC PANEL, BASIC Collection Time: 03/28/19  5:10 AM  
Result Value Ref Range Sodium 143 136 - 145 mmol/L Potassium 4.3 3.5 - 5.1 mmol/L Chloride 109 (H) 98 - 107 mmol/L  
 CO2 28 21 - 32 mmol/L Anion gap 6 (L) 7 - 16 mmol/L Glucose 113 (H) 65 - 100 mg/dL BUN 27 (H) 8 - 23 MG/DL Creatinine 1.05 (H) 0.6 - 1.0 MG/DL  
 GFR est AA >60 >60 ml/min/1.73m2 GFR est non-AA 53 (L) >60 ml/min/1.73m2 Calcium 8.4 8.3 - 10.4 MG/DL All Micro Results Procedure Component Value Units Date/Time MSSA/MRSA SC BY PCR, NASAL SWAB [[id number]]  (Abnormal) Collected:  03/27/19 1530 Order Status:  Completed Specimen:  Swab Updated:  03/27/19 8361 Special Requests: NO SPECIAL REQUESTS Culture result: MRSA target DNA detected, SA target DNA detected. A positive test result does not necessarily indicate the presence of viable organisms. It is however, presumptive for the presence of MRSA or SA. RESULTS VERIFIED, PHONED TO AND READ BACK BY 
Geo Vieyra RN @[phone]  3/27/19 No results found for this visit on 03/27/19. Current Meds: 
Current Facility-Administered Medications Medication Dose Route Frequency  ceFAZolin (ANCEF) 2 g/20 mL in sterile water IV syringe  2 g IntraVENous ON CALL TO OR  
 lactated Ringers infusion  75 mL/hr IntraVENous ON CALL TO OR  
 vancomycin (VANCOCIN) 750 mg in  mL infusion  750 mg IntraVENous ON CALL TO OR  
 amiodarone (CORDARONE) tablet 200 mg  200 mg Oral DAILY  donepezil (ARICEPT) tablet 10 mg  10 mg Oral QHS  DULoxetine (CYMBALTA) capsule 60 mg  60 mg Oral BID  
 gabapentin (NEURONTIN) capsule 300 mg  300 mg Oral TID  levothyroxine (SYNTHROID) tablet 100 mcg  100 mcg Oral ACB  montelukast (SINGULAIR) tablet 10 mg  10 mg Oral DAILY  pantoprazole (PROTONIX) tablet 40 mg  40 mg Oral ACB  pravastatin (PRAVACHOL) tablet 40 mg  40 mg Oral QHS  
 0.9% sodium chloride infusion  75 mL/hr IntraVENous CONTINUOUS  
 sodium chloride (NS) flush 5-40 mL  5-40 mL IntraVENous Q8H  
 sodium chloride (NS) flush 5-40 mL  5-40 mL IntraVENous PRN  
 acetaminophen (TYLENOL) tablet 650 mg  650 mg Oral Q8H  
 tuberculin injection 5 Units  5 Units IntraDERMal ONCE  
 ondansetron (ZOFRAN) injection 4 mg  4 mg IntraVENous Q6H PRN  
  alcohol 62% (NOZIN) nasal  1 Ampule  1 Ampule Topical Q12H  
 oxyCODONE IR (ROXICODONE) tablet 5 mg  5 mg Oral Q4H PRN  
 HYDROmorphone (PF) (DILAUDID) injection 0.5 mg  0.5 mg IntraVENous Q4H PRN  
 alcohol 62% (NOZIN) nasal  1 Ampule  1 Ampule Topical DAILY Other Studies (last 24 hours): Xr Chest Sngl V Result Date: 3/27/2019 Chest X-ray INDICATION: 80-year-old female status post fall with hip fracture repair Comparison exams: 9/19/2013 A portable AP view of the chest was obtained. FINDINGS: The lungs are clear. There are no infiltrates or effusions. The heart size is normal. Supine positioning limits evaluation for any subtle pneumothorax. No visible large pneumothorax seen. The bony thorax is intact. There are calcified lung nodules identified in the right lower lobe. IMPRESSION: No acute findings in the chest  
 
Xr Hip Lt W Or Wo Pelv 2-3 Vws Result Date: 3/27/2019 Indication:Left hip pain status post fall Comparison exams: AP view the pelvis and left hip films dated 4/12/2013 Findings: AP view of the pelvis illustrates intact pelvic ring. Normal bone density. Right hip is within normal limits. There is a comminuted intertrochanteric fracture of the left hip. No dislocation left femoral head. New in comparison to previous study is irregularity in the bone density of the greater trochanter and subcapital region of the left hip. Unclear whether this presents a posttraumatic change versus fracture caused by underlying pathology. Impression:  Comminuted intertrochanteric fracture left hip with no dislocation of the femoral head. Slight irregularity in bone density noted in the greater trochanter and subcapital region as described above. Xr Femur Lt 2 V Result Date: 3/27/2019  Indication:80year-old female status post fall Comparison exams: None Findings: Frontal and lateral views of the left femur demonstrate generalized decreased bony mineralization. The crosstable view of the lower half of the femur slight limited secondary to overexposure limiting visualization of portions of the femoral cortex. AP view appears within normal limits. Impression:  Left hip intertrochanteric fracture  Slight limited visualization portions of the cortex lower femur as described above. Remainder of the left femur is unremarkable Ct Head Wo Cont Result Date: 3/27/2019 INDICATION: 80-year-old female status post fall with pain COMPARISON EXAMS: CT head dated 1/4/2011 FINDINGS: Axial imaging from skull base to vertex without intravenous contrast. Calvarium intact. Paranasal sinuses are clear. Globes and intraconal spaces are unremarkable. No intracranial hemorrhage or acute large territorial infarct. Ventricles normal in size configuration with no midline shift. There is a mild degree of low-attenuation within periventricular white matter consistent appearance of chronic small vessel changes. This may result in slight limited evaluation for any subtle ischemia. No large acute territorial infarct. IMPRESSION: No acute intracranial process Assessment and Plan:  
 
Hospital Problems as of 3/28/2019 Date Reviewed: 3/5/2019 Codes Class Noted - Resolved POA * (Principal) Hip fracture, left ([address] ICD-10-CM: I54.376W ICD-9-CM: 820.8  3/27/2019 - Present Yes Peripheral polyneuropathy ICD-10-CM: G62.9 ICD-9-CM: 356.9  5/21/2018 - Present Yes Alzheimer's dementia without behavioral disturbance (Chronic) ICD-10-CM: G30.9, F02.80 ICD-9-CM: 331.0, 294.10  12/18/2017 - Present Yes Acquired hypothyroidism ICD-10-CM: E03.9 ICD-9-CM: 244.9  12/18/2017 - Present Yes Atrial fibrillation ([address] ICD-10-CM: I48.91 
ICD-9-CM: 427.31  8/9/2013 - Present Yes HTN (hypertension) (Chronic) ICD-10-CM: I10 
ICD-9-CM: 401.9  11/15/2010 - Present Yes Plan: 
 
Left intertrochanteric hip fx Plans for ORIF today per [facility] [place]/OT, pain management, DVT prophylaxis per ortho A Fib Rate controlled on Amio Uncertain if pt was on [address] 
CHADS 2 Fibromyalgia, neuropathy Cont home regimen Neurontin, Cymbalta DC planning SW following for STR placement Diet:  DIET NPO 
DVT ppx:  Lovenox post op Signed: 
Robbie Mejia NP

## 2019-03-28 NOTE — PROGRESS NOTES
Physical Therapy Note: PT orders received and chart reviewed. Patient admitted with left hip fracture and scheduled for surgical repair with Dr. Brice Mott today 3/28/19. Will hold assessment at this time and mobilize post operatively per orthopedic surgery. Thank you, Juan De Souza, PT, DPT 
3/28/19 7:49AM

## 2019-03-28 NOTE — PROGRESS NOTES
TRANSFER - OUT REPORT: 
 
Verbal report given to Fortino Juarez on Shashank Buckner  being transferred to Pre-Op for ordered procedure Report consisted of patients Situation, Background, Assessment and  
Recommendations(SBAR). Information from the following report(s) SBAR, Kardex, MAR, I&O was reviewed with the receiving nurse. Lines:  
Peripheral IV 03/27/19 Right Antecubital (Active) Site Assessment Clean, dry, & intact 3/28/2019  8:01 AM  
Phlebitis Assessment 0 3/28/2019  8:01 AM  
Infiltration Assessment 0 3/28/2019  8:01 AM  
Dressing Status Clean, dry, & intact 3/28/2019  8:01 AM  
Dressing Type Asad;Tape;Transparent 3/28/2019  8:01 AM  
Hub Color/Line Status Green; Infusing 3/28/2019  8:01 AM  
Alcohol Cap Used No 3/28/2019  8:01 AM  
  
 
Opportunity for questions and clarification was provided. Patient transported with: 
Bed via transport Vancomycin running Contacted daughter @ 46.

## 2019-03-28 NOTE — ANESTHESIA POSTPROCEDURE EVALUATION
Procedure(s): LEFT FEMUR INSERTION INTRA MEDULLARY NAIL. total IV anesthesia Anesthesia Post Evaluation Patient location during evaluation: PACU Patient participation: complete - patient participated Level of consciousness: awake Pain management: satisfactory to patient Airway patency: patent Anesthetic complications: no 
Cardiovascular status: hemodynamically stable Respiratory status: spontaneous ventilation Hydration status: euvolemic Post anesthesia nausea and vomiting:  none Vitals Value Taken Time /71 3/28/2019  3:11 PM  
Temp 36.9 °C (98.4 °F) 3/28/2019  2:36 PM  
Pulse 71 3/28/2019  3:12 PM  
Resp 18 3/28/2019  2:52 PM  
SpO2 99 % 3/28/2019  3:12 PM  
Vitals shown include unvalidated device data.

## 2019-03-28 NOTE — PROGRESS NOTES
TRANSFER - IN REPORT: 
 
Verbal report received from Fortunato Tan on [place]  being received from PACU for routine post - op Report consisted of patients Situation, Background, Assessment and  
Recommendations(SBAR). Information from the following report(s) SBAR, Kardex, OR Summary, Procedure Summary, Intake/Output, MAR and Recent Results was reviewed with the receiving nurse. Opportunity for questions and clarification was provided. Assessment completed upon patients arrival to unit and care assumed.

## 2019-03-29 LAB
BASOPHILS # BLD: 0 K/UL (ref 0–0.2)
BASOPHILS NFR BLD: 0 % (ref 0–2)
DIFFERENTIAL METHOD BLD: ABNORMAL
EOSINOPHIL # BLD: 0.1 K/UL (ref 0–0.8)
EOSINOPHIL NFR BLD: 1 % (ref 0.5–7.8)
ERYTHROCYTE [DISTWIDTH] IN BLOOD BY AUTOMATED COUNT: 12.4 % (ref 11.9–14.6)
HCT VFR BLD AUTO: 20.9 % (ref 35.8–46.3)
HGB BLD-MCNC: 6.4 G/DL (ref 11.7–15.4)
HGB BLD-MCNC: 9.9 G/DL (ref 11.7–15.4)
IMM GRANULOCYTES # BLD AUTO: 0 K/UL (ref 0–0.5)
IMM GRANULOCYTES NFR BLD AUTO: 0 % (ref 0–5)
LYMPHOCYTES # BLD: 1.2 K/UL (ref 0.5–4.6)
LYMPHOCYTES NFR BLD: 13 % (ref 13–44)
MCH RBC QN AUTO: 33 PG (ref 26.1–32.9)
MCHC RBC AUTO-ENTMCNC: 30.6 G/DL (ref 31.4–35)
MCV RBC AUTO: 107.7 FL (ref 79.6–97.8)
MM INDURATION POC: 0 MM (ref 0–5)
MONOCYTES # BLD: 1.1 K/UL (ref 0.1–1.3)
MONOCYTES NFR BLD: 12 % (ref 4–12)
NEUTS SEG # BLD: 6.7 K/UL (ref 1.7–8.2)
NEUTS SEG NFR BLD: 73 % (ref 43–78)
NRBC # BLD: 0 K/UL (ref 0–0.2)
PLATELET # BLD AUTO: 159 K/UL (ref 150–450)
PMV BLD AUTO: 10.4 FL (ref 9.4–12.3)
PPD POC: NEGATIVE NEGATIVE
RBC # BLD AUTO: 1.94 M/UL (ref 4.05–5.2)
WBC # BLD AUTO: 9.1 K/UL (ref 4.3–11.1)

## 2019-03-29 PROCEDURE — 65270000029 HC RM PRIVATE

## 2019-03-29 PROCEDURE — 94760 N-INVAS EAR/PLS OXIMETRY 1: CPT

## 2019-03-29 PROCEDURE — 77030020256 HC SOL INJ NACL 0.9%  500ML

## 2019-03-29 PROCEDURE — 74011250637 HC RX REV CODE- 250/637: Performed by: INTERNAL MEDICINE

## 2019-03-29 PROCEDURE — 36415 COLL VENOUS BLD VENIPUNCTURE: CPT

## 2019-03-29 PROCEDURE — 97110 THERAPEUTIC EXERCISES: CPT

## 2019-03-29 PROCEDURE — 74011250637 HC RX REV CODE- 250/637: Performed by: NURSE PRACTITIONER

## 2019-03-29 PROCEDURE — 77030039270 HC TU BLD FLTR CARD -A

## 2019-03-29 PROCEDURE — 85025 COMPLETE CBC W/AUTO DIFF WBC: CPT

## 2019-03-29 PROCEDURE — 85018 HEMOGLOBIN: CPT

## 2019-03-29 PROCEDURE — 30233N1 TRANSFUSION OF NONAUTOLOGOUS RED BLOOD CELLS INTO PERIPHERAL VEIN, PERCUTANEOUS APPROACH: ICD-10-PCS | Performed by: INTERNAL MEDICINE

## 2019-03-29 PROCEDURE — 97530 THERAPEUTIC ACTIVITIES: CPT

## 2019-03-29 PROCEDURE — P9016 RBC LEUKOCYTES REDUCED: HCPCS

## 2019-03-29 PROCEDURE — 97165 OT EVAL LOW COMPLEX 30 MIN: CPT

## 2019-03-29 PROCEDURE — 74011250636 HC RX REV CODE- 250/636: Performed by: NURSE PRACTITIONER

## 2019-03-29 PROCEDURE — 74011250637 HC RX REV CODE- 250/637: Performed by: ORTHOPAEDIC SURGERY

## 2019-03-29 PROCEDURE — 77010033678 HC OXYGEN DAILY

## 2019-03-29 PROCEDURE — 36430 TRANSFUSION BLD/BLD COMPNT: CPT

## 2019-03-29 RX ORDER — SODIUM CHLORIDE 9 MG/ML
250 INJECTION, SOLUTION INTRAVENOUS AS NEEDED
Status: DISCONTINUED | OUTPATIENT
Start: 2019-03-29 | End: 2019-04-01 | Stop reason: HOSPADM

## 2019-03-29 RX ORDER — HALOPERIDOL 5 MG/ML
0.5 INJECTION INTRAMUSCULAR
Status: DISCONTINUED | OUTPATIENT
Start: 2019-03-29 | End: 2019-04-01 | Stop reason: HOSPADM

## 2019-03-29 RX ADMIN — LEVOTHYROXINE SODIUM 100 MCG: 100 TABLET ORAL at 04:00

## 2019-03-29 RX ADMIN — OXYCODONE HYDROCHLORIDE 5 MG: 5 TABLET ORAL at 08:15

## 2019-03-29 RX ADMIN — OXYCODONE HYDROCHLORIDE 5 MG: 5 TABLET ORAL at 20:50

## 2019-03-29 RX ADMIN — OXYCODONE HYDROCHLORIDE 5 MG: 5 TABLET ORAL at 12:07

## 2019-03-29 RX ADMIN — AMIODARONE HYDROCHLORIDE 200 MG: 200 TABLET ORAL at 08:15

## 2019-03-29 RX ADMIN — PANTOPRAZOLE SODIUM 40 MG: 40 TABLET, DELAYED RELEASE ORAL at 04:00

## 2019-03-29 RX ADMIN — Medication 10 ML: at 20:58

## 2019-03-29 RX ADMIN — OXYCODONE HYDROCHLORIDE 5 MG: 5 TABLET ORAL at 04:00

## 2019-03-29 RX ADMIN — ENOXAPARIN SODIUM 30 MG: 30 INJECTION SUBCUTANEOUS at 12:06

## 2019-03-29 RX ADMIN — Medication 1 AMPULE: at 20:49

## 2019-03-29 RX ADMIN — Medication 5 ML: at 13:15

## 2019-03-29 RX ADMIN — MONTELUKAST SODIUM 10 MG: 10 TABLET, FILM COATED ORAL at 08:15

## 2019-03-29 RX ADMIN — CALCIUM CARBONATE 500 MG (1,250 MG)-VITAMIN D3 200 UNIT TABLET 1 TABLET: at 08:15

## 2019-03-29 RX ADMIN — HALOPERIDOL LACTATE 0.5 MG: 5 INJECTION INTRAMUSCULAR at 22:02

## 2019-03-29 RX ADMIN — GABAPENTIN 300 MG: 300 CAPSULE ORAL at 13:13

## 2019-03-29 RX ADMIN — DULOXETINE 60 MG: 60 CAPSULE, DELAYED RELEASE ORAL at 18:00

## 2019-03-29 RX ADMIN — GABAPENTIN 300 MG: 300 CAPSULE ORAL at 04:00

## 2019-03-29 RX ADMIN — DONEPEZIL HYDROCHLORIDE 10 MG: 5 TABLET, FILM COATED ORAL at 20:50

## 2019-03-29 RX ADMIN — ACETAMINOPHEN 650 MG: 325 TABLET, FILM COATED ORAL at 04:00

## 2019-03-29 RX ADMIN — DULOXETINE 60 MG: 60 CAPSULE, DELAYED RELEASE ORAL at 08:15

## 2019-03-29 RX ADMIN — ACETAMINOPHEN 650 MG: 325 TABLET, FILM COATED ORAL at 20:50

## 2019-03-29 RX ADMIN — OXYCODONE HYDROCHLORIDE 5 MG: 5 TABLET ORAL at 16:56

## 2019-03-29 RX ADMIN — DOCUSATE SODIUM 100 MG: 100 CAPSULE, LIQUID FILLED ORAL at 08:15

## 2019-03-29 RX ADMIN — ACETAMINOPHEN 650 MG: 325 TABLET, FILM COATED ORAL at 13:13

## 2019-03-29 RX ADMIN — GABAPENTIN 300 MG: 300 CAPSULE ORAL at 20:49

## 2019-03-29 RX ADMIN — CALCIUM CARBONATE 500 MG (1,250 MG)-VITAMIN D3 200 UNIT TABLET 1 TABLET: at 16:54

## 2019-03-29 RX ADMIN — Medication 1 AMPULE: at 08:15

## 2019-03-29 RX ADMIN — VANCOMYCIN HYDROCHLORIDE 750 MG: 10 INJECTION, POWDER, LYOPHILIZED, FOR SOLUTION INTRAVENOUS at 15:00

## 2019-03-29 RX ADMIN — CALCIUM CARBONATE 500 MG (1,250 MG)-VITAMIN D3 200 UNIT TABLET 1 TABLET: at 12:06

## 2019-03-29 RX ADMIN — DOCUSATE SODIUM 100 MG: 100 CAPSULE, LIQUID FILLED ORAL at 18:00

## 2019-03-29 RX ADMIN — PRAVASTATIN SODIUM 40 MG: 20 TABLET ORAL at 20:50

## 2019-03-29 NOTE — PROGRESS NOTES
Patient accepted at [facility] for [address]. Family agreeable to facility. Elmer pereraert initiated by facility.

## 2019-03-29 NOTE — INTERDISCIPLINARY ROUNDS
Interdisciplinary team rounds were held 3/29/2019 with the following team members:Care Management, Nursing, Occupational Therapy and Physician and the patient. Plan of care discussed. See clinical pathway and/or care plan for interventions and desired outcomes. Plan to discharge to [facility], referrals sent per CM.

## 2019-03-29 NOTE — PROGRESS NOTES
CM attempted x2 to contact patient's daughter, Giovanna Poole, via telephone this morning. Call was unanswered, and voice mailbox is full. CM will continue attempting to contact daughter to discuss choices for STR referrals. Awaiting family decision regarding STR referral options.

## 2019-03-29 NOTE — PROGRESS NOTES
Problem: Mobility Impaired (Adult and Pediatric) Goal: *Acute Goals and Plan of Care (Insert Text) Description STG: 
(1.)Ms. Hood Lorenz will move from supine to sit and sit to supine  with CONTACT GUARD ASSIST within 3 treatment day(s). (2.)Ms. Hood Lorenz will transfer from bed to chair and chair to bed with CONTACT GUARD ASSIST using the least restrictive device within 3 treatment day(s). (3.)Ms. Hood Lorenz will ambulate with CONTACT GUARD ASSIST for 30 feet with the least restrictive device within 3 treatment day(s). LTG: 
(1.)Ms. Hood Lorenz will move from supine to sit and sit to supine  in bed with STAND BY ASSIST within 7 treatment day(s). (2.)Ms. Hood Lorenz will transfer from bed to chair and chair to bed with STAND BY ASSIST using the least restrictive device within 7 treatment day(s). (3.)Ms. Hood Lorenz will ambulate with STAND BY ASSIST for 100+ feet with the least restrictive device within 7 treatment day(s). ________________________________________________________________________________________________ Outcome: Progressing Towards Goal 
 
 
PHYSICAL THERAPY: Daily Note and AM 3/29/2019 INPATIENT: PT Visit Days : 2 Payor: [facility] [place] MEDICARE / Plan: [employer] [place] MED[place]O / Product Type: Managed Care Medicare /   
  
NAME/AGE/GENDER: Cathy Mitchell is a 80 y.o. female PRIMARY DIAGNOSIS: Hip fracture, left ([address] [S72.002A] Hip fracture, left ([facility] Hip fracture, left ([facility] Procedure(s) (LRB): LEFT FEMUR INSERTION INTRA MEDULLARY NAIL (Left) 1 Day Post-Op ICD-10: Treatment Diagnosis:  
 · Generalized Muscle Weakness (M62.81) · Difficulty in walking, Not elsewhere classified (R26.2) · Repeated Falls (R29.6) · History of falling (Z91.81) Precaution/Allergies: 
Patient has no known allergies. WBAT L LE  
ASSESSMENT:  
Ms. Hood Lorenz presents at edge of bed with OT on contact.   Pt stood with min assist and was able to ambulate about 5' to chair with rolling walker and min assist.  Pt ambulates with an antalgic gait pattern and shuffling steps. Difficult for pt to wt shift properly for adequate foot clearance. Pt performed below LE exercises in chair with assist to complete range. Pt was left up in chair with needs in reach, alarm and lap belt on. Will continue with POC. This section established at most recent assessment PROBLEM LIST (Impairments causing functional limitations): 1. Decreased Strength 2. Decreased ADL/Functional Activities 3. Decreased Transfer Abilities 4. Decreased Ambulation Ability/Technique 5. Decreased Balance 6. Increased Pain 7. Decreased Flexibility/Joint Mobility 8. Decreased Knowledge of Precautions 9. Decreased [place] with Home Exercise Program 
10. Decreased Cognition INTERVENTIONS PLANNED: (Benefits and precautions of physical therapy have been discussed with the patient.) 1. Balance Exercise 2. Bed Mobility 3. Family Education 4. Gait Training 5. Home Exercise Program (HEP) 6. Neuromuscular Re-education/Strengthening 7. Range of Motion (ROM) 8. Therapeutic Activites 9. Therapeutic Exercise/Strengthening 10. Transfer Training TREATMENT PLAN: Frequency/Duration: twice daily for duration of hospital stay Rehabilitation Potential For Stated Goals: Good RECOMMENDED REHABILITATION/EQUIPMENT: (at time of discharge pending progress): Due to the probability of continued deficits (see above) this patient will likely need continued skilled physical therapy after discharge. Equipment:  
? None at this time HISTORY:  
History of Present Injury/Illness (Reason for Referral): S/p LEFT FEMUR INSERTION INTRA MEDULLARY NAIL Past Medical History/Comorbidities: Ms. Reji Monet  has a past medical history of Arthritis, Atrial fibrillation ([address] Dementia, Depression, Heart failure ([address] Hyperlipidemia, Hypertension, Hypothyroidism, and Other ill-defined conditions(799.89). Ms. Mandy Chang  has a past surgical history that includes hx orthopaedic; hx cholecystectomy; hx tubal ligation; hx hysterectomy; hx tonsillectomy; and hx small bowel resection (8/8/13). Social History/Living Environment:  
Home Environment: Private residence # Steps to Enter: 2 Rails to Enter: Yes One/Two Story Residence: One story Living Alone: Yes Support Systems: (states she doesn't have any family) Patient Expects to be Discharged to[de-identified] Rehabilitation facility Current DME Used/Available at Home: Eufemia Quesada, [address] [facility] chair, [facility] Prior Level of Function/Work/Activity: 
Independent, drives, history of falling Number of Personal Factors/Comorbidities that affect the Plan of Care: 3+: HIGH COMPLEXITY EXAMINATION:  
Most Recent Physical Functioning:  
Gross Assessment: 
  
         
  
Posture: 
  
Balance: 
Sitting - Static: Good (unsupported) Sitting - Dynamic: Fair (occasional) Standing - Static: Fair Standing - Dynamic : Fair Bed Mobility: 
  
Wheelchair Mobility: 
  
Transfers: 
Sit to Stand: Minimum assistance Gait: 
Left Side Weight Bearing: As tolerated Base of Support: Narrowed; Shift to right Speed/Ashley: Slow;Shuffled Step Length: Left shortened;Right shortened Gait Abnormalities: Antalgic;Decreased step clearance;Shuffling gait Distance (ft): 3 Feet (ft) Assistive Device: Walker, rolling Ambulation - Level of Assistance: Minimal assistance Interventions: Safety awareness training;Verbal cues Body Structures Involved: 1. Nerves 2. Bones 3. Joints 4. Muscles 5. Ligaments Body Functions Affected: 1. Mental 
2. Sensory/Pain 3. Cardio 4. Respiratory 5. Neuromusculoskeletal 
6. Movement Related Activities and Participation Affected: 1. Learning and Applying Knowledge 2. General Tasks and Demands 3. Mobility 4. Self Care 5. Domestic Life 6. Interpersonal Interactions and Relationships 7. Community, Social and [place] [employer] Number of elements that affect the Plan of Care: 4+: HIGH COMPLEXITY CLINICAL PRESENTATION:  
Presentation: Evolving clinical presentation with changing clinical characteristics: MODERATE COMPLEXITY CLINICAL DECISION MAKING:  
[employer] AM-PAC 6 Clicks Basic Mobility Inpatient Short Form How much difficulty does the patient currently have. .. Unable A Lot A Little None 1. Turning over in bed (including adjusting bedclothes, sheets and blankets)? ? 1   ? 2   ? 3   ? 4  
2. Sitting down on and standing up from a chair with arms ( e.g., wheelchair, bedside commode, etc.)   ? 1   ? 2   ? 3   ? 4  
3. Moving from lying on back to sitting on the side of the bed?   ? 1   ? 2   ? 3   ? 4 How much help from another person does the patient currently need. .. Total A Lot A Little None 4. Moving to and from a bed to a chair (including a wheelchair)? ? 1   ? 2   ? 3   ? 4  
5. Need to walk in hospital room? ? 1   ? 2   ? 3   ? 4  
6. Climbing 3-5 steps with a railing? ? 1   ? 2   ? 3   ? 4  
© 2007, Trustees of [facility] [employer], under license to [employer] All rights reserved Score:  Initial: 16 Most Recent: X (Date: -- ) Interpretation of Tool:  Represents activities that are increasingly more difficult (i.e. Bed mobility, Transfers, Gait). Medical Necessity:    
· Patient is expected to demonstrate progress in strength, range of motion, balance, coordination and functional technique ·  to decrease assistance required with gait, transfers, and functional mobility. · . Reason for Services/Other Comments: 
· Patient continues to require skilled intervention due to decreased strength, decreased balance, decreased functional tolerance, decreased cardiopulmonary endurance affecting participation in basic ADLs and functional tasks · . Use of outcome tool(s) and clinical judgement create a POC that gives a: Clear prediction of patient's progress: LOW COMPLEXITY TREATMENT:  
  
Pre-treatment Symptoms/Complaints:  L LE \"soreness\" Pain: Initial:  
   Post Session:  Increased with WBing 2/10, visual  
 
Therapeutic Activity: (    0 minutes): Therapeutic activities including Bed transfers, Chair transfers, Ambulation on level ground and cues for ease and safety of transfers  to improve mobility, strength, balance and coordination. Required minimal Safety awareness training;Verbal cues to promote static and dynamic balance in standing and promote coordination of bilateral, upper extremity(s), lower extremity(s). Therapeutic Exercise: (  14 minutes):  Exercises per grid below to improve mobility and strength. Required minimal visual, verbal and manual cues to promote proper body posture and promote proper body mechanics. Progressed range and repetitions as indicated. Date: 
3/29/19 Date: 
 Date: 
  
ACTIVITY/EXERCISE AM PM AM PM AM PM  
Ambulation:           Distance Device Duration 3' to chair with RW and min A Seated Heel Raises X 10 B Seated Toe Raises X 10 B Seated Long Arc Quads X 10 B, AA-L Seated Marching X 10 B, AA-L Seated Hip Abduction X 10 B, AA-L       
        
B = bilateral; AA = active assistive; A = active; P = passive Braces/Orthotics/Lines/Etc:  
· IV 
· estes catheter · O2 Device: Nasal cannula Treatment/Session Assessment:   
· Response to Treatment:  bed to chair with RW and Yara · Interdisciplinary Collaboration:  
o Physical Therapy Assistant 
o Occupational Therapist 
o Registered Nurse · After treatment position/precautions:  
o Up in chair 
o Bed alarm/tab alert on 
o Bed/Chair-wheels locked 
o Bed in low position 
o Call light within reach 
o RN notified · Compliance with Program/Exercises: Will assess as treatment progresses · Recommendations/Intent for next treatment session: \"Next visit will focus on advancements to more challenging activities and reduction in assistance provided\". Total Treatment Duration: PT Patient Time In/Time Out Time In: 1438 Time Out: 6600 Collin Israel, PTA

## 2019-03-29 NOTE — PROGRESS NOTES
Hospitalist Progress Note Admit Date:  3/27/2019 12:41 PM  
Name:  Nora Sy Age:  80 y.o. 
:  1933 MRN:  [id number] PCP:  Larisa Dean MD 
Treatment Team: Attending Provider: Flaco Alvarez MD; Consulting Provider: Francisco Wharton MD; Utilization Review: Shaista Soliz; Care Manager: Chikis Moreno; Occupational Therapist: Pamela Payne OT; Physical Therapy Assistant: Hansa Henry PTA Subjective:  
 
Pt is a 79 yo female with pmh A Fib, dementia, chronic pain who was admitted  with left hip fx.  s/p ORIF 3/28 with Dr. Davin Conte. Pt awake, alert but confused this AM. Complaining of pain \"all over. \"  
 
Objective:  
 
Patient Vitals for the past 24 hrs: 
 Temp Pulse Resp BP SpO2  
19 0848 98 °F (36.7 °C) 70 18 131/61 95 % 19 0748 98 °F (36.7 °C) 78 18 135/74 94 % 19 0733 97.9 °F (36.6 °C) 70 18 112/62 94 % 19 0716 97.8 °F (36.6 °C) 69 18 110/61 95 % 19 0404 97.5 °F (36.4 °C) 74 18 110/60 90 % 19 0015 98.5 °F (36.9 °C) 89 16 126/68 91 %  
19 2013 98.2 °F (36.8 °C) 61 18 129/64 94 % 19 1600  69 18 147/70   
19 1545  67 18 142/68   
19 1530  67 17 157/56   
19 1515  67 18 162/74   
19 1500 97.6 °F (36.4 °C) 71 18 155/74 92 % 19 1452  72 18 152/66 99 % 19 1447  70 16 156/72 100 % 19 1442  70 16 149/67 99 % 19 1436 98.4 °F (36.9 °C) 69 16 147/67 99 % 19 1431  69 16 147/69 100 % 19 1426  68 18 148/68 100 % 19 1422  70 16 156/67 99 % 19 1416  68 16 147/70 100 % 19 1411  70 18 151/69 97 % 19 1406  67 16 129/67 100 % 19 1401  65 16 129/61 100 % 19 1357  66 14 133/60 99 % 19 1353 97.8 °F (36.6 °C) 64 14 146/63 100 % 19 1151 98.1 °F (36.7 °C) 82 16 121/57 91 %  
19 1100 97.8 °F (36.6 °C) 74 16 124/55 90 % Oxygen Therapy O2 Sat (%): 95 % (03/29/19 0848) Pulse via Oximetry: 72 beats per minute (03/28/19 1452) O2 Device: Nasal cannula (03/28/19 1436) O2 Flow Rate (L/min): 4 l/min (03/28/19 1436) Intake/Output Summary (Last 24 hours) at 3/29/2019 4109 Last data filed at 3/29/2019 1650 Gross per 24 hour Intake 740 ml Output 775 ml Net -35 ml General:    Well nourished. Alert. CV:   RRR. No murmur, rub, or gallop. Lungs:   CTAB. No wheezing, rhonchi, or rales. Abdomen:   Soft, nontender, nondistended. Extremities: Warm and dry. No cyanosis or edema. Skin:     No rashes or jaundice. Neuro:  No gross focal deficits Psych:  Awake, alert, disoriented Data Review: 
I have reviewed all labs, meds, telemetry events, and studies from the last 24 hours: 
 
Recent Results (from the past 24 hour(s)) PLEASE READ & DOCUMENT PPD TEST IN 24 HRS Collection Time: 03/28/19  3:39 PM  
Result Value Ref Range PPD  Negative  
 mm Induration  0 - 5 mm  
 mm Induration  0 - 5 0  
CBC WITH AUTOMATED DIFF Collection Time: 03/29/19  3:40 AM  
Result Value Ref Range WBC 9.1 4.3 - 11.1 K/uL  
 RBC 1.94 (L) 4.05 - 5.2 M/uL HGB 6.4 (LL) 11.7 - 15.4 g/dL HCT 20.9 (LL) 35.8 - 46.3 % .7 (H) 79.6 - 97.8 FL  
 MCH 33.0 (H) 26.1 - 32.9 PG  
 MCHC 30.6 (L) 31.4 - 35.0 g/dL  
 RDW 12.4 11.9 - 14.6 % PLATELET 553 478 - 673 K/uL MPV 10.4 9.4 - 12.3 FL ABSOLUTE NRBC 0.00 0.0 - 0.2 K/uL  
 DF AUTOMATED NEUTROPHILS 73 43 - 78 % LYMPHOCYTES 13 13 - 44 % MONOCYTES 12 4.0 - 12.0 % EOSINOPHILS 1 0.5 - 7.8 % BASOPHILS 0 0.0 - 2.0 % IMMATURE GRANULOCYTES 0 0.0 - 5.0 %  
 ABS. NEUTROPHILS 6.7 1.7 - 8.2 K/UL  
 ABS. LYMPHOCYTES 1.2 0.5 - 4.6 K/UL  
 ABS. MONOCYTES 1.1 0.1 - 1.3 K/UL  
 ABS. EOSINOPHILS 0.1 0.0 - 0.8 K/UL  
 ABS. BASOPHILS 0.0 0.0 - 0.2 K/UL  
 ABS. IMM. GRANS. 0.0 0.0 - 0.5 K/UL All Micro Results Procedure Component Value Units Date/Time MSSA/MRSA SC BY PCR, NASAL SWAB [[id number]]  (Abnormal) Collected:  03/27/19 1530 Order Status:  Completed Specimen:  Swab Updated:  03/27/19 9767 Special Requests: NO SPECIAL REQUESTS Culture result: MRSA target DNA detected, SA target DNA detected. A positive test result does not necessarily indicate the presence of viable organisms. It is however, presumptive for the presence of MRSA or SA. RESULTS VERIFIED, PHONED TO AND READ BACK BY 
Darral Client RN @2040  3/27/19 No results found for this visit on 03/27/19. Current Meds: 
Current Facility-Administered Medications Medication Dose Route Frequency  0.9% sodium chloride infusion 250 mL  250 mL IntraVENous PRN  
 0.9% sodium chloride infusion 250 mL  250 mL IntraVENous PRN  
 lactated Ringers infusion  75 mL/hr IntraVENous CONTINUOUS  
 sodium chloride (NS) flush 5-40 mL  5-40 mL IntraVENous Q8H  
 sodium chloride (NS) flush 5-40 mL  5-40 mL IntraVENous PRN  
 acetaminophen (TYLENOL) tablet 650 mg  650 mg Oral Q8H  
 ondansetron (ZOFRAN) injection 4 mg  4 mg IntraVENous Q4H PRN  
 docusate sodium (COLACE) capsule 100 mg  100 mg Oral BID  alum-mag hydroxide-simeth (MYLANTA) oral suspension 30 mL  30 mL Oral Q4H PRN  
 calcium-vitamin D (OS-AMANDA) 500 mg-200 unit tablet  1 Tab Oral TID WITH MEALS  enoxaparin (LOVENOX) injection 30 mg  30 mg SubCUTAneous Q24H  
 vancomycin (VANCOCIN) 750 mg in  mL infusion  750 mg IntraVENous Q24H  
 oxyCODONE IR (ROXICODONE) tablet 5 mg  5 mg Oral Q4H PRN  
 traMADol (ULTRAM) tablet 50 mg  50 mg Oral Q6H PRN  
 amiodarone (CORDARONE) tablet 200 mg  200 mg Oral DAILY  donepezil (ARICEPT) tablet 10 mg  10 mg Oral QHS  DULoxetine (CYMBALTA) capsule 60 mg  60 mg Oral BID  
 gabapentin (NEURONTIN) capsule 300 mg  300 mg Oral TID  levothyroxine (SYNTHROID) tablet 100 mcg  100 mcg Oral ACB  montelukast (SINGULAIR) tablet 10 mg  10 mg Oral DAILY  pantoprazole (PROTONIX) tablet 40 mg  40 mg Oral ACB  pravastatin (PRAVACHOL) tablet 40 mg  40 mg Oral QHS  alcohol 62% (NOZIN) nasal  1 Ampule  1 Ampule Topical Q12H Other Studies (last 24 hours): Xr Femur Lt 2 V Result Date: 3/28/2019 LEFT FEMUR 4 view(s). HISTORY: Status post hip pinning for intertrochanteric fracture. TECHNIQUE: Limited pre and postoperative AP and lateral views. COMPARISON: None. FINDINGS: Intratrochanteric fracture has been reduced and an fixed with a nail through the femoral neck and long intramedullary pb. IMPRESSION: Status post left hip pinning. Assessment and Plan:  
 
Hospital Problems as of 3/29/2019 Date Reviewed: 3/5/2019 Codes Class Noted - Resolved POA * (Principal) Hip fracture, left ([facility] ICD-10-CM: P23.153W ICD-9-CM: 820.8  3/27/2019 - Present Yes Peripheral polyneuropathy ICD-10-CM: G62.9 ICD-9-CM: 356.9  5/21/2018 - Present Yes Alzheimer's dementia without behavioral disturbance (Chronic) ICD-10-CM: G30.9, F02.80 ICD-9-CM: 331.0, 294.10  12/18/2017 - Present Yes Acquired hypothyroidism ICD-10-CM: E03.9 ICD-9-CM: 244.9  12/18/2017 - Present Yes Atrial fibrillation ([facility] ICD-10-CM: I48.91 
ICD-9-CM: 427.31  8/9/2013 - Present Yes HTN (hypertension) (Chronic) ICD-10-CM: I10 
ICD-9-CM: 401.9  11/15/2010 - Present Yes Plan: 
 
Left intertrochanteric hip fx S/p ORIF 3/28 with Dr. Jermain Ham - Cont PT/OT, pain management, DVT prophylaxis per ortho Acute post op anemia Hgb 6.4. 
- transfuse 1 unit 
- follow hgb q8h and transfuse PRN hgb<7 A Fib Rate controlled on Amio  
- does not appear that she is on anticoagulation CHADS 2 Fibromyalgia, neuropathy Cont home regimen Neurontin, Cymbalta Dementia Previously living at home alone but doubt this will be possible going forward. Her dementia appears too advanced. SW has discussed long term placement with family following STR. Dispo - STR in 1-2 days Diet:  DIET REGULAR 
DVT ppx:  Lovenox Signed: 
Mann Guerra MD

## 2019-03-29 NOTE — PROGRESS NOTES
Problem: Self Care Deficits Care Plan (Adult) Goal: *Acute Goals and Plan of Care (Insert Text) Description 1. Patient will complete lower body bathing and dressing with minimal assistance and adaptive equipment as needed. 2. Patient will complete toileting with minimal assistance. 3. Patient will tolerate 25 minutes of OT treatment with 1-2 rest breaks to increase activity tolerance for ADLs. 4. Patient will complete functional transfers with supervision and adaptive equipment as needed. 5. Patient will complete functional mobility for household distances with CGA and minimal cues for safety. Timeframe: 7 visits Outcome: Progressing Towards Goal 
  
 
OCCUPATIONAL THERAPY: Initial Assessment and AM 3/29/2019 INPATIENT:   
Payor[place] / [place]O / Product Type: Managed Care Medicare /  
 L  WBAT 
NAME/AGE/GENDER: Han Mendieta is a 80 y.o. female PRIMARY DIAGNOSIS:  Hip fracture, left ([address] [S72.002A] Hip fracture, left ([address] Hip fracture, left ([address] Procedure(s) (LRB): LEFT FEMUR INSERTION INTRA MEDULLARY NAIL (Left) 1 Day Post-Op ICD-10: Treatment Diagnosis:  
 · Pain in left hip (M25.552) · Stiffness of Left Hip, Not elsewhere classified (M25.652) · Generalized Muscle Weakness (M62.81) · Other lack of cordination (R27.8) · History of falling (Z91.81) Precautions/Allergies: 
   Patient has no known allergies. ASSESSMENT:  
Ms. Brigid Lopez was admitted with L hip fx and is s/p the above surgery. Pt is currently WBAT in the L LE. Pt lives alone in a single story home  and reports that she is independent with ADL at baseline. Pt states she drives occasionally and receives \"Meals on [employer]". Pt appears confused and is hard of hearing, therefore pt's PLOF was difficult to obtain.  This session, pt presented supine in bed and is getting assistance for bathing from PCT. Pt is off subject with questions. Pt appears to have pain in her L hip with rolling and bed mobility. Pt demonstrated deficits in strength, pain levels, cognition, ROM, and impaired balance impacting ADLs. Pt transferred to the edge of the bed with minimal assistance. Once sitting edge of bed pt demonstrated good static balance. Pt demonstrates functional strength in the UEs to assist with scooting to the edge of the bed with CGA. Pt's hgb is currently 6.4 and pt is receiving a blood transfusion, therefore activity is somewhat limited. Pt was able to stand at the edge of the bed with minimal assistance but demonstrates decreased weightbearing in the L LE. At the end of the session, pt was left working with PTA with needs in reach. Pt presented below functional baseline and would benefit from skilled OT services to address deficits. This section established at most recent assessment PROBLEM LIST (Impairments causing functional limitations): 1. Decreased Strength 2. Decreased ADL/Functional Activities 3. Decreased Transfer Abilities 4. Decreased Ambulation Ability/Technique 5. Decreased Balance 6. Increased Pain 7. Decreased Activity Tolerance 8. Increased Fatigue 9. Decreased Flexibility/Joint Mobility 10. Decreased [place] with Home Exercise Program 
11. Decreased Cognition INTERVENTIONS PLANNED: (Benefits and precautions of occupational therapy have been discussed with the patient.) 1. Activities of daily living training 2. Adaptive equipment training 3. Balance training 4. Clothing management 5. Cognitive training 6. Donning&doffing training 7. Neuromuscular re-eduation 8. Therapeutic activity 9. Therapeutic exercise TREATMENT PLAN: Frequency/Duration: Follow patient 6 times per week to address above goals. Rehabilitation Potential For Stated Goals: Good RECOMMENDED REHABILITATION/EQUIPMENT: (at time of discharge pending progress): Due to the probability of continued deficits (see above) this patient will likely need continued skilled occupational therapy after discharge. Equipment: ? TBD   
    
 
 
 
OCCUPATIONAL PROFILE AND HISTORY:  
History of Present Injury/Illness (Reason for Referral): 
See H&P Past Medical History/Comorbidities: Ms. Franck Short  has a past medical history of Arthritis, Atrial fibrillation ([address] Dementia, Depression, Heart failure ([address] Hyperlipidemia, Hypertension, Hypothyroidism, and Other ill-defined conditions(799.89). Ms. Franck Short  has a past surgical history that includes hx orthopaedic; hx cholecystectomy; hx tubal ligation; hx hysterectomy; hx tonsillectomy; and hx small bowel resection (8/8/13). Social History/Living Environment:  
Home Environment: Private residence # Steps to Enter: 2 Rails to Enter: Yes One/Two Story Residence: One story Living Alone: Yes Support Systems: (states she doesn't have any family) Patient Expects to be Discharged to[de-identified] Rehabilitation facility Current DME Used/Available at Home: Kory Scott, [address] [facility] chair, [facility] Prior Level of Function/Work/Activity: 
Pt lives alone in a single story home  and reports that she is independent with ADL at baseline. Pt states she drives occasionally and receives \"Meals on [employer]". Pt appears confused and is hard of hearing, therefore pt's PLOF was difficult to obtain. Personal Factors:   
      Social Background:   Lives alone Past/Current Experience:  hx of falling; dementia per chart Other factors that influence how disability is experienced by the patient:  multiple co-morbidities Number of Personal Factors/Comorbidities that affect the Plan of Care: Extensive review of physical, cognitive, and psychosocial performance (3+):  HIGH COMPLEXITY ASSESSMENT OF OCCUPATIONAL PERFORMANCE[de-identified]  
Activities of Daily Living:  
Basic ADLs (From Assessment) Complex ADLs (From Assessment) Feeding: Stand-by assistance Oral Facial Hygiene/Grooming: Minimum assistance Bathing: Moderate assistance Upper Body Dressing: Minimum assistance Lower Body Dressing: Maximum assistance Toileting: Maximum assistance Instrumental ADL Meal Preparation: Total assistance Homemaking: Total assistance Grooming/Bathing/Dressing Activities of Daily Living Cognitive Retraining Safety/Judgement: Fall prevention;Home safety Bed/Mat Mobility Rolling: Moderate assistance Supine to Sit: Minimum assistance Sit to Stand: Minimum assistance Most Recent Physical Functioning:  
Gross Assessment: 
AROM: Generally decreased, functional 
Strength: Generally decreased, functional 
Coordination: Generally decreased, functional 
         
  
Posture: 
  
Balance: 
Sitting: Impaired Sitting - Static: Good (unsupported) Sitting - Dynamic: Fair (occasional) Standing: Impaired; With support Standing - Static: Fair Standing - Dynamic : Fair Bed Mobility: 
Rolling: Moderate assistance Supine to Sit: Minimum assistance Wheelchair Mobility: 
  
Transfers: 
Sit to Stand: Minimum assistance Patient Vitals for the past 6 hrs: 
 BP BP Patient Position SpO2 Pulse 03/29/19 0716 110/61 At rest 95 % 69  
03/29/19 0733 112/62 At rest 94 % 70  
03/29/19 0748 135/74 During activity 94 % 78  
03/29/19 0848 131/61 At rest 95 % 70 Mental Status Neurologic State: Alert, Confused Orientation Level: Oriented to person(state she \"broke her hip\") Cognition: Decreased attention/concentration, Follows commands Perception: Appears intact Perseveration: No perseveration noted Safety/Judgement: Fall prevention, Home safety Physical Skills Involved: 1. Range of Motion 2. Balance 3. Strength 4. Activity Tolerance 5. Pain (acute) Cognitive Skills Affected (resulting in the inability to perform in a timely and safe manner): 1. Executive Function 2. Short Term Recall 3. Sustained Attention 4. Divided Attention 5. Comprehension Psychosocial Skills Affected: 1. Habits/Routines 2. Environmental Adaptation 3. Self-Awareness Number of elements that affect the Plan of Care: 5+:  HIGH COMPLEXITY CLINICAL DECISION MAKIN[address] [place] [id number] AM-PAC 6 Clicks Daily Activity Inpatient Short Form How much help from another person does the patient currently need. .. Total A Lot A Little None 1. Putting on and taking off regular lower body clothing? ? 1   ? 2   ? 3   ? 4  
2. Bathing (including washing, rinsing, drying)? ? 1   ? 2   ? 3   ? 4  
3. Toileting, which includes using toilet, bedpan or urinal?   ? 1   ? 2   ? 3   ? 4  
4. Putting on and taking off regular upper body clothing? ? 1   ? 2   ? 3   ? 4  
5. Taking care of personal grooming such as brushing teeth? ? 1   ? 2   ? 3   ? 4  
6. Eating meals? ? 1   ? 2   ? 3   ? 4  
© 2007, Trustees of [address] [place] [id number], under license to [employer]. All rights reserved Score:  Initial: 15 Most Recent: X (Date: -- ) Interpretation of Tool:  Represents activities that are increasingly more difficult (i.e. Bed mobility, Transfers, Gait). Medical Necessity:    
· Patient demonstrates good ·  rehab potential due to higher previous functional level. Reason for Services/Other Comments: 
· Patient continues to require skilled intervention due to decreased independence with ADL/functional transfers · . Use of outcome tool(s) and clinical judgement create a POC that gives a: LOW COMPLEXITY  
 
 
 
TREATMENT:  
(In addition to Assessment/Re-Assessment sessions the following treatments were rendered) Pre-treatment Symptoms/Complaints:   
Pain: Initial:  
Pain Intensity 1: 4 Pain Location 1: Hip Pain Orientation 1: Left Pain Intervention(s) 1: Repositioned  Post Session:  same Assessment/Reassessment only, no treatment provided today Braces/Orthotics/Lines/Etc:  
· IV 
· O2 Device: Nasal cannula Treatment/Session Assessment:   
· Response to Treatment:  Eval only · Interdisciplinary Collaboration:  
o Occupational Therapist 
o Registered Nurse · After treatment position/precautions:  
o Working with PTA · Compliance with Program/Exercises: Will assess as treatment progresses. · Recommendations/Intent for next treatment session: \"Next visit will focus on advancements to more challenging activities and reduction in assistance provided\". Total Treatment Duration: OT Patient Time In/Time Out Time In: 6717 Time Out: 5234 Ayah Corrales OT

## 2019-03-29 NOTE — PROGRESS NOTES
Problem: Mobility Impaired (Adult and Pediatric) Goal: *Acute Goals and Plan of Care (Insert Text) Description STG: 
(1.)Ms. Sara Barillas will move from supine to sit and sit to supine  with CONTACT GUARD ASSIST within 3 treatment day(s). (2.)Ms. Sara Barillas will transfer from bed to chair and chair to bed with CONTACT GUARD ASSIST using the least restrictive device within 3 treatment day(s). (3.)Ms. Sara Barillas will ambulate with CONTACT GUARD ASSIST for 30 feet with the least restrictive device within 3 treatment day(s). LTG: 
(1.)Ms. Sara Barillas will move from supine to sit and sit to supine  in bed with STAND BY ASSIST within 7 treatment day(s). (2.)Ms. Sara Barillas will transfer from bed to chair and chair to bed with STAND BY ASSIST using the least restrictive device within 7 treatment day(s). (3.)Ms. Sara Barillas will ambulate with STAND BY ASSIST for 100+ feet with the least restrictive device within 7 treatment day(s). ________________________________________________________________________________________________ Outcome: Progressing Towards Goal 
 
 
PHYSICAL THERAPY: Daily Note and PM 3/29/2019 INPATIENT: PT Visit Days : 2 Payor: [facility] [place] MEDICARE / Plan: [place] OF [place] H[place]O / Product Type: Managed Care Medicare /   
  
NAME/AGE/GENDER: Ernie Tinsley is a 80 y.o. female PRIMARY DIAGNOSIS: Hip fracture, left ([address] [S72.002A] Hip fracture, left ([address] Hip fracture, left ([facility] Procedure(s) (LRB): LEFT FEMUR INSERTION INTRA MEDULLARY NAIL (Left) 1 Day Post-Op ICD-10: Treatment Diagnosis:  
 · Generalized Muscle Weakness (M62.81) · Difficulty in walking, Not elsewhere classified (R26.2) · Repeated Falls (R29.6) · History of falling (Z91.81) Precaution/Allergies: 
Patient has no known allergies. WBAT L LE  
ASSESSMENT:  
Ms. Sara Barillas presents sitting up in chair and has no complaints.   Pt stood and ambulated about 3' back to bed using rolling walker and min/mod assist.  Pt not following cues to push thru UE's in order to take wt off L LE with ambulation. Pt has difficulty with wt shifting properly fro adequate foot clearance. Pt continues to ambulate with an antalgic gait pattern and shuffling steps. Slow progress towards goals. Will continue with POC. This section established at most recent assessment PROBLEM LIST (Impairments causing functional limitations): 1. Decreased Strength 2. Decreased ADL/Functional Activities 3. Decreased Transfer Abilities 4. Decreased Ambulation Ability/Technique 5. Decreased Balance 6. Increased Pain 7. Decreased Flexibility/Joint Mobility 8. Decreased Knowledge of Precautions 9. Decreased [place] with Home Exercise Program 
10. Decreased Cognition INTERVENTIONS PLANNED: (Benefits and precautions of physical therapy have been discussed with the patient.) 1. Balance Exercise 2. Bed Mobility 3. Family Education 4. Gait Training 5. Home Exercise Program (HEP) 6. Neuromuscular Re-education/Strengthening 7. Range of Motion (ROM) 8. Therapeutic Activites 9. Therapeutic Exercise/Strengthening 10. Transfer Training TREATMENT PLAN: Frequency/Duration: twice daily for duration of hospital stay Rehabilitation Potential For Stated Goals: Good RECOMMENDED REHABILITATION/EQUIPMENT: (at time of discharge pending progress): Due to the probability of continued deficits (see above) this patient will likely need continued skilled physical therapy after discharge. Equipment:  
? None at this time HISTORY:  
History of Present Injury/Illness (Reason for Referral): S/p LEFT FEMUR INSERTION INTRA MEDULLARY NAIL Past Medical History/Comorbidities: Ms. Hood Lorenz  has a past medical history of Arthritis, Atrial fibrillation ([address] Dementia, Depression, Heart failure ([address] Hyperlipidemia, Hypertension, Hypothyroidism, and Other ill-defined conditions(799.89). Ms. Karl Capellan  has a past surgical history that includes hx orthopaedic; hx cholecystectomy; hx tubal ligation; hx hysterectomy; hx tonsillectomy; and hx small bowel resection (8/8/13). Social History/Living Environment:  
Home Environment: Private residence # Steps to Enter: 2 Rails to Enter: Yes One/Two Story Residence: One story Living Alone: Yes Support Systems: (states she doesn't have any family) Patient Expects to be Discharged to[de-identified] Rehabilitation facility Current DME Used/Available at Home: Galdino Villalta, [address] [facility] chair, Leanne Borrego, straight Prior Level of Function/Work/Activity: 
Independent, drives, history of falling Number of Personal Factors/Comorbidities that affect the Plan of Care: 3+: HIGH COMPLEXITY EXAMINATION:  
Most Recent Physical Functioning:  
Gross Assessment: 
  
         
  
Posture: 
  
Balance: 
Sitting - Static: Good (unsupported) Sitting - Dynamic: Fair (occasional) Standing - Static: Fair Standing - Dynamic : Fair Bed Mobility: 
  
Wheelchair Mobility: 
  
Transfers: 
Sit to Stand: Minimum assistance Gait: 
Left Side Weight Bearing: As tolerated Base of Support: Narrowed; Shift to right Speed/Ashley: Slow;Shuffled Step Length: Left shortened;Right shortened Gait Abnormalities: Antalgic;Decreased step clearance;Shuffling gait Distance (ft): 3 Feet (ft) Assistive Device: Walker, rolling Ambulation - Level of Assistance: Minimal assistance Interventions: Safety awareness training;Verbal cues Body Structures Involved: 1. Nerves 2. Bones 3. Joints 4. Muscles 5. Ligaments Body Functions Affected: 1. Mental 
2. Sensory/Pain 3. Cardio 4. Respiratory 5. Neuromusculoskeletal 
6. Movement Related Activities and Participation Affected: 1. Learning and Applying Knowledge 2. General Tasks and Demands 3. Mobility 4. Self Care 5. Domestic Life 6. Interpersonal Interactions and Relationships 7. Community, Social and [place] Number of elements that affect the Plan of Care: 4+: HIGH COMPLEXITY CLINICAL PRESENTATION:  
Presentation: Evolving clinical presentation with changing clinical characteristics: MODERATE COMPLEXITY CLINICAL DECISION MAKIN[address] [place] [id number] AM-PAC 6 Clicks Basic Mobility Inpatient Short Form How much difficulty does the patient currently have. .. Unable A Lot A Little None 1. Turning over in bed (including adjusting bedclothes, sheets and blankets)? ? 1   ? 2   ? 3   ? 4  
2. Sitting down on and standing up from a chair with arms ( e.g., wheelchair, bedside commode, etc.)   ? 1   ? 2   ? 3   ? 4  
3. Moving from lying on back to sitting on the side of the bed?   ? 1   ? 2   ? 3   ? 4 How much help from another person does the patient currently need. .. Total A Lot A Little None 4. Moving to and from a bed to a chair (including a wheelchair)? ? 1   ? 2   ? 3   ? 4  
5. Need to walk in hospital room? ? 1   ? 2   ? 3   ? 4  
6. Climbing 3-5 steps with a railing? ? 1   ? 2   ? 3   ? 4  
© 2007, Trustees of [address] [place] [address], under license to [employer]. All rights reserved Score:  Initial: 16 Most Recent: X (Date: -- ) Interpretation of Tool:  Represents activities that are increasingly more difficult (i.e. Bed mobility, Transfers, Gait). Medical Necessity:    
· Patient is expected to demonstrate progress in strength, range of motion, balance, coordination and functional technique ·  to decrease assistance required with gait, transfers, and functional mobility. · . Reason for Services/Other Comments: 
· Patient continues to require skilled intervention due to decreased strength, decreased balance, decreased functional tolerance, decreased cardiopulmonary endurance affecting participation in basic ADLs and functional tasks · . Use of outcome tool(s) and clinical judgement create a POC that gives a: Clear prediction of patient's progress: LOW COMPLEXITY TREATMENT:  
  
Pre-treatment Symptoms/Complaints:  L LE \"soreness\" Pain: Initial:  
   Post Session:  Increased with WBing 2/10, visual  
 
Therapeutic Activity: (    14 minutes): Therapeutic activities including Bed transfers, Chair transfers, Ambulation on level ground and cues for ease and safety of transfers  to improve mobility, strength, balance and coordination. Required minimal Safety awareness training;Verbal cues to promote static and dynamic balance in standing and promote coordination of bilateral, upper extremity(s), lower extremity(s). Therapeutic Exercise: (  0 minutes):  Exercises per grid below to improve mobility and strength. Required minimal visual, verbal and manual cues to promote proper body posture and promote proper body mechanics. Progressed range and repetitions as indicated. Date: 
3/29/19 Date: 
 Date: 
  
ACTIVITY/EXERCISE AM PM AM PM AM PM  
Ambulation:           Distance Device Duration 3' to chair with RW and min A Seated Heel Raises X 10 B Seated Toe Raises X 10 B Seated Long Arc Quads X 10 B, AA-L Seated Marching X 10 B, AA-L Seated Hip Abduction X 10 B, AA-L       
        
B = bilateral; AA = active assistive; A = active; P = passive Braces/Orthotics/Lines/Etc:  
· IV 
· estes catheter · O2 Device: Nasal cannula Treatment/Session Assessment:   
· Response to Treatment:  Transfers/ambulation this afternoon with min/mod assist  
· Interdisciplinary Collaboration:  
o Physical Therapy Assistant 
o Registered Nurse · After treatment position/precautions:  
o Supine in bed 
o Bed alarm/tab alert on 
o Bed/Chair-wheels locked 
o Bed in low position 
o Call light within reach 
o RN notified · Compliance with Program/Exercises: Will assess as treatment progresses · Recommendations/Intent for next treatment session: \"Next visit will focus on advancements to more challenging activities and reduction in assistance provided\". Total Treatment Duration: PT Patient Time In/Time Out Time In: 7691 Time Out: 4034 Lynne Llamas, FRANKIE

## 2019-03-29 NOTE — PROGRESS NOTES
CM reached patient's daughter, Ford Palm, by telephone. She has chosen STR referral options to [facility] and [employer]. CM placed referrals in [facility]. Awaiting responses.

## 2019-03-30 LAB
ABO + RH BLD: NORMAL
ANION GAP SERPL CALC-SCNC: 5 MMOL/L (ref 7–16)
BASOPHILS # BLD: 0 K/UL (ref 0–0.2)
BASOPHILS NFR BLD: 0 % (ref 0–2)
BLD PROD TYP BPU: NORMAL
BLD PROD TYP BPU: NORMAL
BLOOD GROUP ANTIBODIES SERPL: NORMAL
BPU ID: NORMAL
BPU ID: NORMAL
BUN SERPL-MCNC: 15 MG/DL (ref 8–23)
CALCIUM SERPL-MCNC: 8.2 MG/DL (ref 8.3–10.4)
CHLORIDE SERPL-SCNC: 105 MMOL/L (ref 98–107)
CO2 SERPL-SCNC: 31 MMOL/L (ref 21–32)
CREAT SERPL-MCNC: 0.7 MG/DL (ref 0.6–1)
CROSSMATCH RESULT,%XM: NORMAL
CROSSMATCH RESULT,%XM: NORMAL
DIFFERENTIAL METHOD BLD: ABNORMAL
EOSINOPHIL # BLD: 0 K/UL (ref 0–0.8)
EOSINOPHIL NFR BLD: 0 % (ref 0.5–7.8)
ERYTHROCYTE [DISTWIDTH] IN BLOOD BY AUTOMATED COUNT: 14.6 % (ref 11.9–14.6)
GLUCOSE SERPL-MCNC: 96 MG/DL (ref 65–100)
HCT VFR BLD AUTO: 31.2 % (ref 35.8–46.3)
HGB BLD-MCNC: 9.8 G/DL (ref 11.7–15.4)
IMM GRANULOCYTES # BLD AUTO: 0.1 K/UL (ref 0–0.5)
IMM GRANULOCYTES NFR BLD AUTO: 1 % (ref 0–5)
LYMPHOCYTES # BLD: 1.2 K/UL (ref 0.5–4.6)
LYMPHOCYTES NFR BLD: 12 % (ref 13–44)
MCH RBC QN AUTO: 31.8 PG (ref 26.1–32.9)
MCHC RBC AUTO-ENTMCNC: 31.4 G/DL (ref 31.4–35)
MCV RBC AUTO: 101.3 FL (ref 79.6–97.8)
MM INDURATION POC: 0 MM (ref 0–5)
MONOCYTES # BLD: 0.9 K/UL (ref 0.1–1.3)
MONOCYTES NFR BLD: 9 % (ref 4–12)
NEUTS SEG # BLD: 7.9 K/UL (ref 1.7–8.2)
NEUTS SEG NFR BLD: 77 % (ref 43–78)
NRBC # BLD: 0 K/UL (ref 0–0.2)
PLATELET # BLD AUTO: 177 K/UL (ref 150–450)
PMV BLD AUTO: 10.2 FL (ref 9.4–12.3)
POTASSIUM SERPL-SCNC: 3.3 MMOL/L (ref 3.5–5.1)
PPD POC: NEGATIVE NEGATIVE
RBC # BLD AUTO: 3.08 M/UL (ref 4.05–5.2)
SODIUM SERPL-SCNC: 141 MMOL/L (ref 136–145)
SPECIMEN EXP DATE BLD: NORMAL
STATUS OF UNIT,%ST: NORMAL
STATUS OF UNIT,%ST: NORMAL
UNIT DIVISION, %UDIV: 0
UNIT DIVISION, %UDIV: 0
WBC # BLD AUTO: 10.2 K/UL (ref 4.3–11.1)

## 2019-03-30 PROCEDURE — 94760 N-INVAS EAR/PLS OXIMETRY 1: CPT

## 2019-03-30 PROCEDURE — 74011250637 HC RX REV CODE- 250/637: Performed by: ORTHOPAEDIC SURGERY

## 2019-03-30 PROCEDURE — 36415 COLL VENOUS BLD VENIPUNCTURE: CPT

## 2019-03-30 PROCEDURE — 97110 THERAPEUTIC EXERCISES: CPT

## 2019-03-30 PROCEDURE — 80048 BASIC METABOLIC PNL TOTAL CA: CPT

## 2019-03-30 PROCEDURE — 85025 COMPLETE CBC W/AUTO DIFF WBC: CPT

## 2019-03-30 PROCEDURE — 65270000029 HC RM PRIVATE

## 2019-03-30 PROCEDURE — 77030020263 HC SOL INJ SOD CL0.9% LFCR 1000ML

## 2019-03-30 PROCEDURE — 97530 THERAPEUTIC ACTIVITIES: CPT

## 2019-03-30 PROCEDURE — 77030039270 HC TU BLD FLTR CARD -A

## 2019-03-30 PROCEDURE — 74011250636 HC RX REV CODE- 250/636: Performed by: NURSE PRACTITIONER

## 2019-03-30 PROCEDURE — 74011250637 HC RX REV CODE- 250/637: Performed by: NURSE PRACTITIONER

## 2019-03-30 PROCEDURE — 74011250637 HC RX REV CODE- 250/637: Performed by: INTERNAL MEDICINE

## 2019-03-30 RX ORDER — POTASSIUM CHLORIDE 20 MEQ/1
40 TABLET, EXTENDED RELEASE ORAL
Status: COMPLETED | OUTPATIENT
Start: 2019-03-30 | End: 2019-03-30

## 2019-03-30 RX ADMIN — DULOXETINE 60 MG: 60 CAPSULE, DELAYED RELEASE ORAL at 09:44

## 2019-03-30 RX ADMIN — ACETAMINOPHEN 650 MG: 325 TABLET, FILM COATED ORAL at 05:40

## 2019-03-30 RX ADMIN — Medication 5 ML: at 15:54

## 2019-03-30 RX ADMIN — OXYCODONE HYDROCHLORIDE 5 MG: 5 TABLET ORAL at 21:55

## 2019-03-30 RX ADMIN — HALOPERIDOL LACTATE 0.5 MG: 5 INJECTION INTRAMUSCULAR at 02:03

## 2019-03-30 RX ADMIN — ONDANSETRON 4 MG: 2 INJECTION INTRAMUSCULAR; INTRAVENOUS at 12:00

## 2019-03-30 RX ADMIN — PRAVASTATIN SODIUM 40 MG: 20 TABLET ORAL at 21:53

## 2019-03-30 RX ADMIN — AMIODARONE HYDROCHLORIDE 200 MG: 200 TABLET ORAL at 09:44

## 2019-03-30 RX ADMIN — OXYCODONE HYDROCHLORIDE 5 MG: 5 TABLET ORAL at 09:44

## 2019-03-30 RX ADMIN — Medication 1 AMPULE: at 21:54

## 2019-03-30 RX ADMIN — Medication 10 ML: at 21:53

## 2019-03-30 RX ADMIN — DULOXETINE 60 MG: 60 CAPSULE, DELAYED RELEASE ORAL at 17:59

## 2019-03-30 RX ADMIN — CALCIUM CARBONATE 500 MG (1,250 MG)-VITAMIN D3 200 UNIT TABLET 1 TABLET: at 15:54

## 2019-03-30 RX ADMIN — GABAPENTIN 300 MG: 300 CAPSULE ORAL at 15:54

## 2019-03-30 RX ADMIN — LEVOTHYROXINE SODIUM 100 MCG: 100 TABLET ORAL at 05:40

## 2019-03-30 RX ADMIN — GABAPENTIN 300 MG: 300 CAPSULE ORAL at 21:53

## 2019-03-30 RX ADMIN — ACETAMINOPHEN 650 MG: 325 TABLET, FILM COATED ORAL at 15:54

## 2019-03-30 RX ADMIN — CALCIUM CARBONATE 500 MG (1,250 MG)-VITAMIN D3 200 UNIT TABLET 1 TABLET: at 17:59

## 2019-03-30 RX ADMIN — DONEPEZIL HYDROCHLORIDE 10 MG: 5 TABLET, FILM COATED ORAL at 21:53

## 2019-03-30 RX ADMIN — GABAPENTIN 300 MG: 300 CAPSULE ORAL at 05:40

## 2019-03-30 RX ADMIN — DOCUSATE SODIUM 100 MG: 100 CAPSULE, LIQUID FILLED ORAL at 09:44

## 2019-03-30 RX ADMIN — HALOPERIDOL LACTATE 0.5 MG: 5 INJECTION INTRAMUSCULAR at 12:46

## 2019-03-30 RX ADMIN — POTASSIUM CHLORIDE 40 MEQ: 20 TABLET, EXTENDED RELEASE ORAL at 09:44

## 2019-03-30 RX ADMIN — ACETAMINOPHEN 650 MG: 325 TABLET, FILM COATED ORAL at 21:53

## 2019-03-30 RX ADMIN — Medication 1 AMPULE: at 09:44

## 2019-03-30 RX ADMIN — PANTOPRAZOLE SODIUM 40 MG: 40 TABLET, DELAYED RELEASE ORAL at 05:40

## 2019-03-30 RX ADMIN — CALCIUM CARBONATE 500 MG (1,250 MG)-VITAMIN D3 200 UNIT TABLET 1 TABLET: at 09:44

## 2019-03-30 RX ADMIN — DOCUSATE SODIUM 100 MG: 100 CAPSULE, LIQUID FILLED ORAL at 17:59

## 2019-03-30 RX ADMIN — MONTELUKAST SODIUM 10 MG: 10 TABLET, FILM COATED ORAL at 09:44

## 2019-03-30 RX ADMIN — OXYCODONE HYDROCHLORIDE 5 MG: 5 TABLET ORAL at 15:54

## 2019-03-30 NOTE — PROGRESS NOTES
Physical therapy note: Attempted PT this PM about 13:15. Per RN who was in room, pt had become agitated and was just assisted back to bed. Will continue to treat as pt is able and time/schedule permit.

## 2019-03-30 NOTE — PROGRESS NOTES
Patient tore off her dressing to LLE again, new sterile dressing applied. Patient was bathed again with new linen on bed. Currently patient is sitting in bed watching TV with nurse at bedside. Will continue to monitor.

## 2019-03-30 NOTE — PROGRESS NOTES
Patient very confused, combative, pulled out IV and removing clothes, throwing off pillows and blankets, attempting to get out of bed, yelling out. Patient split hand mitts in half. Spoke with Ashley Selby NP. Ordered Haldol 0.5mg IM q4 hours PRN.

## 2019-03-30 NOTE — PROGRESS NOTES
Hospitalist Progress Note Admit Date:  3/27/2019 12:41 PM  
Name:  Han Mendieta Age:  80 y.o. 
:  1933 MRN:  [id number] PCP:  Jemal James MD 
Treatment Team: Attending Provider: Fitz Law MD; Consulting Provider: Say Guzman MD; Utilization Review: Lynn Escalona; Care Manager: Leonora Moreira; Physical Therapy Assistant: Katlyn Nelson PTA Subjective:  
 
Pt is a 79 yo female with pmh A Fib, dementia, chronic pain who was admitted  with left hip fx.  s/p ORIF 3/28 with Dr. Eva Flannery. Pt awake, alert. No specific complaints. No new issues per nsg. Objective:  
 
Patient Vitals for the past 24 hrs: 
 Temp Pulse Resp BP SpO2  
19 0754     90 % 19 0729 97.7 °F (36.5 °C) 80 17 125/62 94 % 19 0318 97.6 °F (36.4 °C) 83 18 147/75 90 % 19 0046 97.3 °F (36.3 °C) 86 18 141/69 95 % 19 1947 98.2 °F (36.8 °C) 72 18 135/71 90 % 19 1521 99.1 °F (37.3 °C) 70 18 150/65 95 % 19 1315 98 °F (36.7 °C) 68 20 132/69 96 % 19 1252 98.3 °F (36.8 °C) 69 20 124/59 96 % 19 1152 98 °F (36.7 °C) 68 20 138/72 96 % 19 1112     96 % 19 1052 98 °F (36.7 °C) 66 18 143/67 95 % 19 1037 98 °F (36.7 °C) 68 18 136/70 94 % 19 1028 98 °F (36.7 °C) 66 18 138/72 94 % Oxygen Therapy O2 Sat (%): 90 % (19 0754) Pulse via Oximetry: 74 beats per minute (19 0754) O2 Device: Room air (19 0754) O2 Flow Rate (L/min): 2 l/min (19 1112) Intake/Output Summary (Last 24 hours) at 3/30/2019 1020 Last data filed at 3/30/2019 4731 Gross per 24 hour Intake 570 ml Output 1100 ml Net -530 ml General:    Well nourished. Alert. CV:   RRR. No murmur, rub, or gallop. Lungs:   CTAB. No wheezing, rhonchi, or rales. Abdomen:   Soft, nontender, nondistended. Extremities: Warm and dry. No cyanosis or edema. Skin:     No rashes or jaundice. Neuro:  No gross focal deficits Psych:  Awake, alert, disoriented Data Review: 
I have reviewed all labs, meds, telemetry events, and studies from the last 24 hours: 
 
Recent Results (from the past 24 hour(s)) HEMOGLOBIN Collection Time: 03/29/19  2:52 PM  
Result Value Ref Range HGB 9.9 (L) 11.7 - 15.4 g/dL PLEASE READ & DOCUMENT PPD TEST IN 48 HRS Collection Time: 03/29/19  3:49 PM  
Result Value Ref Range PPD Negative Negative  
 mm Induration 0 0 - 5 mm CBC WITH AUTOMATED DIFF Collection Time: 03/30/19  5:24 AM  
Result Value Ref Range WBC 10.2 4.3 - 11.1 K/uL  
 RBC 3.08 (L) 4.05 - 5.2 M/uL HGB 9.8 (L) 11.7 - 15.4 g/dL HCT 31.2 (L) 35.8 - 46.3 % .3 (H) 79.6 - 97.8 FL  
 MCH 31.8 26.1 - 32.9 PG  
 MCHC 31.4 31.4 - 35.0 g/dL  
 RDW 14.6 11.9 - 14.6 % PLATELET 409 293 - 010 K/uL MPV 10.2 9.4 - 12.3 FL ABSOLUTE NRBC 0.00 0.0 - 0.2 K/uL  
 DF AUTOMATED NEUTROPHILS 77 43 - 78 % LYMPHOCYTES 12 (L) 13 - 44 % MONOCYTES 9 4.0 - 12.0 % EOSINOPHILS 0 (L) 0.5 - 7.8 % BASOPHILS 0 0.0 - 2.0 % IMMATURE GRANULOCYTES 1 0.0 - 5.0 %  
 ABS. NEUTROPHILS 7.9 1.7 - 8.2 K/UL  
 ABS. LYMPHOCYTES 1.2 0.5 - 4.6 K/UL  
 ABS. MONOCYTES 0.9 0.1 - 1.3 K/UL  
 ABS. EOSINOPHILS 0.0 0.0 - 0.8 K/UL  
 ABS. BASOPHILS 0.0 0.0 - 0.2 K/UL  
 ABS. IMM. GRANS. 0.1 0.0 - 0.5 K/UL METABOLIC PANEL, BASIC Collection Time: 03/30/19  5:24 AM  
Result Value Ref Range Sodium 141 136 - 145 mmol/L Potassium 3.3 (L) 3.5 - 5.1 mmol/L Chloride 105 98 - 107 mmol/L  
 CO2 31 21 - 32 mmol/L Anion gap 5 (L) 7 - 16 mmol/L Glucose 96 65 - 100 mg/dL BUN 15 8 - 23 MG/DL Creatinine 0.70 0.6 - 1.0 MG/DL  
 GFR est AA >60 >60 ml/min/1.73m2 GFR est non-AA >60 >60 ml/min/1.73m2 Calcium 8.2 (L) 8.3 - 10.4 MG/DL All Micro Results Procedure Component Value Units Date/Time MSSA/MRSA SC BY PCR, NASAL SWAB [[id number]]  (Abnormal) Collected:  03/27/19 1591 Order Status:  Completed Specimen:  Swab Updated:  03/27/19 1722 Special Requests: NO SPECIAL REQUESTS Culture result: MRSA target DNA detected, SA target DNA detected. A positive test result does not necessarily indicate the presence of viable organisms. It is however, presumptive for the presence of MRSA or SA. RESULTS VERIFIED, PHONED TO AND READ BACK BY 
Be Smith RN @6129  3/27/19 No results found for this visit on 03/27/19. Current Meds: 
Current Facility-Administered Medications Medication Dose Route Frequency  0.9% sodium chloride infusion 250 mL  250 mL IntraVENous PRN  
 0.9% sodium chloride infusion 250 mL  250 mL IntraVENous PRN  
 haloperidol lactate (HALDOL) injection 0.5 mg  0.5 mg IntraMUSCular Q4H PRN  
 lactated Ringers infusion  75 mL/hr IntraVENous CONTINUOUS  
 sodium chloride (NS) flush 5-40 mL  5-40 mL IntraVENous Q8H  
 sodium chloride (NS) flush 5-40 mL  5-40 mL IntraVENous PRN  
 acetaminophen (TYLENOL) tablet 650 mg  650 mg Oral Q8H  
 ondansetron (ZOFRAN) injection 4 mg  4 mg IntraVENous Q4H PRN  
 docusate sodium (COLACE) capsule 100 mg  100 mg Oral BID  alum-mag hydroxide-simeth (MYLANTA) oral suspension 30 mL  30 mL Oral Q4H PRN  
 calcium-vitamin D (OS-AMANDA) 500 mg-200 unit tablet  1 Tab Oral TID WITH MEALS  enoxaparin (LOVENOX) injection 30 mg  30 mg SubCUTAneous Q24H  
 oxyCODONE IR (ROXICODONE) tablet 5 mg  5 mg Oral Q4H PRN  
 traMADol (ULTRAM) tablet 50 mg  50 mg Oral Q6H PRN  
 amiodarone (CORDARONE) tablet 200 mg  200 mg Oral DAILY  donepezil (ARICEPT) tablet 10 mg  10 mg Oral QHS  DULoxetine (CYMBALTA) capsule 60 mg  60 mg Oral BID  
 gabapentin (NEURONTIN) capsule 300 mg  300 mg Oral TID  levothyroxine (SYNTHROID) tablet 100 mcg  100 mcg Oral ACB  montelukast (SINGULAIR) tablet 10 mg  10 mg Oral DAILY  pantoprazole (PROTONIX) tablet 40 mg  40 mg Oral ACB  pravastatin (PRAVACHOL) tablet 40 mg  40 mg Oral QHS  alcohol 62% (NOZIN) nasal  1 Ampule  1 Ampule Topical Q12H Other Studies (last 24 hours): No results found. Assessment and Plan:  
 
Hospital Problems as of 3/30/2019 Date Reviewed: 3/5/2019 Codes Class Noted - Resolved POA * (Principal) Hip fracture, left ([facility] ICD-10-CM: W31.095N ICD-9-CM: 820.8  3/27/2019 - Present Yes Peripheral polyneuropathy ICD-10-CM: G62.9 ICD-9-CM: 356.9  5/21/2018 - Present Yes Alzheimer's dementia without behavioral disturbance (Chronic) ICD-10-CM: G30.9, F02.80 ICD-9-CM: 331.0, 294.10  12/18/2017 - Present Yes Acquired hypothyroidism ICD-10-CM: E03.9 ICD-9-CM: 244.9  12/18/2017 - Present Yes Atrial fibrillation ([facility] ICD-10-CM: I48.91 
ICD-9-CM: 427.31  8/9/2013 - Present Yes HTN (hypertension) (Chronic) ICD-10-CM: I10 
ICD-9-CM: 401.9  11/15/2010 - Present Yes Plan: 
 
Left intertrochanteric hip fx S/p ORIF 3/28 with Dr. Brush Ra - Cont PT/OT, pain management, DVT prophylaxis per ortho Acute post op anemia Hgb 6.4, up to 9.9 after 1 unit pRBCs. Stable at 9.8 this AM 
- follow hgb A Fib Rate controlled on Amio. CHADS 2 
- does not appear that she is on anticoagulation Fibromyalgia, neuropathy Cont home regimen Neurontin, Cymbalta Dementia Previously living at home alone but doubt this will be possible going forward. Her dementia appears too advanced. SW has discussed long term placement with family following STR. Dispo - STR in 1-2 days - pending auth Diet:  DIET REGULAR 
DVT ppx:  Lovenox Signed: 
Mark Cantu MD

## 2019-03-30 NOTE — PROGRESS NOTES
Problem: Mobility Impaired (Adult and Pediatric) Goal: *Acute Goals and Plan of Care (Insert Text) Description STG: 
(1.)Ms. Hood Lorenz will move from supine to sit and sit to supine  with CONTACT GUARD ASSIST within 3 treatment day(s). (2.)Ms. Hood Lorenz will transfer from bed to chair and chair to bed with CONTACT GUARD ASSIST using the least restrictive device within 3 treatment day(s). (3.)Ms. Hood Lorenz will ambulate with CONTACT GUARD ASSIST for 30 feet with the least restrictive device within 3 treatment day(s). LTG: 
(1.)Ms. Hood Lorenz will move from supine to sit and sit to supine  in bed with STAND BY ASSIST within 7 treatment day(s). (2.)Ms. Hood Lorenz will transfer from bed to chair and chair to bed with STAND BY ASSIST using the least restrictive device within 7 treatment day(s). (3.)Ms. Hood Lorenz will ambulate with STAND BY ASSIST for 100+ feet with the least restrictive device within 7 treatment day(s). ________________________________________________________________________________________________ Outcome: Progressing Towards Goal 
 
 
PHYSICAL THERAPY: Daily Note and AM 3/30/2019 INPATIENT: PT Visit Days : 3 Payor: [facility] [place] MEDICARE / Plan: [employer] [place] MED[place] H[place]/PPO / Product Type: Managed Care Medicare /   
  
NAME/AGE/GENDER: Cathy Mitchell is a 80 y.o. female PRIMARY DIAGNOSIS: Hip fracture, left ([address] [S72.002A] Hip fracture, left ([facility] Hip fracture, left ([facility] Procedure(s) (LRB): LEFT FEMUR INSERTION INTRA MEDULLARY NAIL (Left) 2 Days Post-Op ICD-10: Treatment Diagnosis:  
 · Generalized Muscle Weakness (M62.81) · Difficulty in walking, Not elsewhere classified (R26.2) · Repeated Falls (R29.6) · History of falling (Z91.81) Precaution/Allergies: 
Patient has no known allergies. WBAT L LE  
ASSESSMENT:  
Ms. Hood Lorenz presents supine in bed. Following directions to sit at EOB with min assist and additional time.    Pt stood and ambulated about 15' in room and to recliner chair WBAT L and min assist. She experienced on LOB requiring max assist to correct. Once in chair she was given a couple of minutes to rest and then she performed bilateral LE ex following verbal and visual cues. Pt continues to ambulate with an antalgic gait pattern and shuffling steps. Slow progress towards goals. Will continue with POC. This section established at most recent assessment PROBLEM LIST (Impairments causing functional limitations): 1. Decreased Strength 2. Decreased ADL/Functional Activities 3. Decreased Transfer Abilities 4. Decreased Ambulation Ability/Technique 5. Decreased Balance 6. Increased Pain 7. Decreased Flexibility/Joint Mobility 8. Decreased Knowledge of Precautions 9. Decreased [place] with Home Exercise Program 
10. Decreased Cognition INTERVENTIONS PLANNED: (Benefits and precautions of physical therapy have been discussed with the patient.) 1. Balance Exercise 2. Bed Mobility 3. Family Education 4. Gait Training 5. Home Exercise Program (HEP) 6. Neuromuscular Re-education/Strengthening 7. Range of Motion (ROM) 8. Therapeutic Activites 9. Therapeutic Exercise/Strengthening 10. Transfer Training TREATMENT PLAN: Frequency/Duration: twice daily for duration of hospital stay Rehabilitation Potential For Stated Goals: Good RECOMMENDED REHABILITATION/EQUIPMENT: (at time of discharge pending progress): Due to the probability of continued deficits (see above) this patient will likely need continued skilled physical therapy after discharge. Equipment:  
? None at this time HISTORY:  
History of Present Injury/Illness (Reason for Referral): S/p LEFT FEMUR INSERTION INTRA MEDULLARY NAIL Past Medical History/Comorbidities: Ms. Cee Renteria  has a past medical history of Arthritis, Atrial fibrillation ([address] Dementia, Depression, Heart failure ([address] Hyperlipidemia, Hypertension, Hypothyroidism, and Other ill-defined conditions(799.89). Ms. Katiuska Jung  has a past surgical history that includes hx orthopaedic; hx cholecystectomy; hx tubal ligation; hx hysterectomy; hx tonsillectomy; and hx small bowel resection (8/8/13). Social History/Living Environment:  
Home Environment: Private residence # Steps to Enter: 2 Rails to Enter: Yes One/Two Story Residence: One story Living Alone: Yes Support Systems: (states she doesn't have any family) Patient Expects to be Discharged to[de-identified] Rehabilitation facility Current DME Used/Available at Home: [employer], [address] [facility] [address], [address] [facility], [place], [facility] Prior Level of Function/Work/Activity: 
Independent, drives, history of falling Number of Personal Factors/Comorbidities that affect the Plan of Care: 3+: HIGH COMPLEXITY EXAMINATION:  
Most Recent Physical Functioning:  
Gross Assessment: 
  
         
  
Posture: 
  
Balance: 
Sitting: Impaired Sitting - Static: Good (unsupported) Sitting - Dynamic: Fair (occasional) Standing: Impaired Standing - Static: Fair Standing - Dynamic : Fair Bed Mobility: 
Supine to Sit: Minimum assistance; Additional time Wheelchair Mobility: 
  
Transfers: 
Sit to Stand: Minimum assistance Stand to Sit: Minimum assistance Gait: 
Left Side Weight Bearing: As tolerated Base of Support: Narrowed Speed/Ashley: Shuffled; Slow Step Length: Left shortened;Right shortened Stance: Left decreased;Right decreased Gait Abnormalities: Antalgic;Decreased step clearance Distance (ft): 15 Feet (ft) Assistive Device: Walker, rolling Ambulation - Level of Assistance: Minimal assistance(max for 1 LOB) Interventions: Safety awareness training;Verbal cues Body Structures Involved: 1. Nerves 2. Bones 3. Joints 4. Muscles 5. Ligaments Body Functions Affected: 1. Mental 
2. Sensory/Pain 3. Cardio 4. Respiratory 5. Neuromusculoskeletal 
6. Movement Related Activities and Participation Affected: 1. Learning and Applying Knowledge 2. General Tasks and Demands 3. Mobility 4. Self Care 5. Domestic Life 6. Interpersonal Interactions and Relationships 7. Community, Social and [place] Number of elements that affect the Plan of Care: 4+: HIGH COMPLEXITY CLINICAL PRESENTATION:  
Presentation: Evolving clinical presentation with changing clinical characteristics: MODERATE COMPLEXITY CLINICAL DECISION MAKING:  
[facility] AM-PAC 6 Clicks Basic Mobility Inpatient Short Form How much difficulty does the patient currently have. .. Unable A Lot A Little None 1. Turning over in bed (including adjusting bedclothes, sheets and blankets)? ? 1   ? 2   ? 3   ? 4  
2. Sitting down on and standing up from a chair with arms ( e.g., wheelchair, bedside commode, etc.)   ? 1   ? 2   ? 3   ? 4  
3. Moving from lying on back to sitting on the side of the bed?   ? 1   ? 2   ? 3   ? 4 How much help from another person does the patient currently need. .. Total A Lot A Little None 4. Moving to and from a bed to a chair (including a wheelchair)? ? 1   ? 2   ? 3   ? 4  
5. Need to walk in hospital room? ? 1   ? 2   ? 3   ? 4  
6. Climbing 3-5 steps with a railing? ? 1   ? 2   ? 3   ? 4  
© 2007, Trustees of [facility], under license to [employer]. All rights reserved Score:  Initial: 16 Most Recent: X (Date: -- ) Interpretation of Tool:  Represents activities that are increasingly more difficult (i.e. Bed mobility, Transfers, Gait). Medical Necessity:    
· Patient is expected to demonstrate progress in strength, range of motion, balance, coordination and functional technique ·  to decrease assistance required with gait, transfers, and functional mobility. · . Reason for Services/Other Comments: 
· Patient continues to require skilled intervention due to decreased strength, decreased balance, decreased functional tolerance, decreased cardiopulmonary endurance affecting participation in basic ADLs and functional tasks · . Use of outcome tool(s) and clinical judgement create a POC that gives a: Clear prediction of patient's progress: LOW COMPLEXITY  
  
 
 
 
TREATMENT:  
  
Pre-treatment Symptoms/Complaints:  No complaints Pain: Initial:  
Pain Intensity 1: 0   Post Session:  0 visual  
 
Therapeutic Activity: (    16 minutes): Therapeutic activities including Bed transfers, Chair transfers, Ambulation on level ground and cues for ease and safety of transfers  to improve mobility, strength, balance and coordination. Required minimal Safety awareness training;Verbal cues to promote static and dynamic balance in standing and promote coordination of bilateral, upper extremity(s), lower extremity(s). Therapeutic Exercise: (  08 minutes):  Exercises per grid below to improve mobility and strength. Required minimal visual, verbal and manual cues to promote proper body posture and promote proper body mechanics. Progressed range and repetitions as indicated. Date: 
3/29/19 Date: 
3/30/19 Date: 
  
ACTIVITY/EXERCISE AM PM AM PM AM PM  
Ambulation:           Distance Device Duration 3' to chair with RW and min A Seated Heel Raises X 10 B  X 10 B Seated Toe Raises X 10 B  X 10 B Seated Long Arc Quads X 10 B, AA-L  X 10 B Seated Marching X 10 B, AA-L Seated Hip Abduction X 10 B, AA-L  X 10 B     
        
B = bilateral; AA = active assistive; A = active; P = passive Braces/Orthotics/Lines/Etc:  
· room air Treatment/Session Assessment:   
· Response to Treatment:  Transfers/ambulation this afternoon with min/mod assist  
· Interdisciplinary Collaboration:  
o Physical Therapy Assistant 
o Registered Nurse · After treatment position/precautions:  
o Up in chair 
o Bed alarm/tab alert on 
o Bed/Chair-wheels locked 
o Bed in low position 
o Call light within reach 
o RN notified · Compliance with Program/Exercises: Will assess as treatment progresses · Recommendations/Intent for next treatment session: \"Next visit will focus on advancements to more challenging activities and reduction in assistance provided\". Total Treatment Duration: PT Patient Time In/Time Out Time In: 8160 Time Out: 0130 Timothy Layton PTA

## 2019-03-30 NOTE — PROGRESS NOTES
ORTHO Sub:  Pt complains of expected pain. Obj:   
Vitals:  
 03/30/19 5408 03/30/19 9158 03/30/19 0729 03/30/19 4813 BP: 141/69 147/75 125/62 Pulse: 86 83 80 Resp: 18 18 17 Temp: 97.3 °F (36.3 °C) 97.6 °F (36.4 °C) 97.7 °F (36.5 °C) SpO2: 95% 90% 94% 90% Weight:      
Height:      
  
  
Recent Labs  
  03/30/19 
0524 03/29/19 
1452 03/29/19 
0340 03/27/19 
1248 WBC 10.2  --  9.1 12.7* HGB 9.8* 9.9* 6.4* 9.7* Dressing is dry and intact. Calves are soft and non tender. There is no redness or appearance of infection. N/V status is good. Assessment:   Progressing slowly Plan:   Continue with Physical Therapy. Watch labs Awaiting placement            Ulysses Duncan MD

## 2019-03-30 NOTE — PROGRESS NOTES
Patient was confused (wait belt) [place] daughter at bedside Refocused patient on being safe She was pulling at her belt Comforted daughter with words of hope Prayer offered Mauricio Lenz, staff [address], [id number] [facility] [address]  /   [email or web address]

## 2019-03-31 LAB
ANION GAP SERPL CALC-SCNC: 4 MMOL/L (ref 7–16)
BASOPHILS # BLD: 0 K/UL (ref 0–0.2)
BASOPHILS NFR BLD: 1 % (ref 0–2)
BUN SERPL-MCNC: 13 MG/DL (ref 8–23)
CALCIUM SERPL-MCNC: 8.4 MG/DL (ref 8.3–10.4)
CHLORIDE SERPL-SCNC: 106 MMOL/L (ref 98–107)
CO2 SERPL-SCNC: 32 MMOL/L (ref 21–32)
CREAT SERPL-MCNC: 0.65 MG/DL (ref 0.6–1)
DIFFERENTIAL METHOD BLD: ABNORMAL
EOSINOPHIL # BLD: 0.2 K/UL (ref 0–0.8)
EOSINOPHIL NFR BLD: 3 % (ref 0.5–7.8)
ERYTHROCYTE [DISTWIDTH] IN BLOOD BY AUTOMATED COUNT: 14.4 % (ref 11.9–14.6)
GLUCOSE SERPL-MCNC: 80 MG/DL (ref 65–100)
HCT VFR BLD AUTO: 30.1 % (ref 35.8–46.3)
HGB BLD-MCNC: 9.5 G/DL (ref 11.7–15.4)
IMM GRANULOCYTES # BLD AUTO: 0 K/UL (ref 0–0.5)
IMM GRANULOCYTES NFR BLD AUTO: 0 % (ref 0–5)
LYMPHOCYTES # BLD: 1.5 K/UL (ref 0.5–4.6)
LYMPHOCYTES NFR BLD: 22 % (ref 13–44)
MCH RBC QN AUTO: 32 PG (ref 26.1–32.9)
MCHC RBC AUTO-ENTMCNC: 31.6 G/DL (ref 31.4–35)
MCV RBC AUTO: 101.3 FL (ref 79.6–97.8)
MONOCYTES # BLD: 0.7 K/UL (ref 0.1–1.3)
MONOCYTES NFR BLD: 11 % (ref 4–12)
NEUTS SEG # BLD: 4.4 K/UL (ref 1.7–8.2)
NEUTS SEG NFR BLD: 64 % (ref 43–78)
NRBC # BLD: 0 K/UL (ref 0–0.2)
PLATELET # BLD AUTO: 214 K/UL (ref 150–450)
PMV BLD AUTO: 10.1 FL (ref 9.4–12.3)
POTASSIUM SERPL-SCNC: 4.1 MMOL/L (ref 3.5–5.1)
RBC # BLD AUTO: 2.97 M/UL (ref 4.05–5.2)
SODIUM SERPL-SCNC: 142 MMOL/L (ref 136–145)
WBC # BLD AUTO: 6.9 K/UL (ref 4.3–11.1)

## 2019-03-31 PROCEDURE — 97530 THERAPEUTIC ACTIVITIES: CPT

## 2019-03-31 PROCEDURE — 74011250637 HC RX REV CODE- 250/637: Performed by: ORTHOPAEDIC SURGERY

## 2019-03-31 PROCEDURE — 85025 COMPLETE CBC W/AUTO DIFF WBC: CPT

## 2019-03-31 PROCEDURE — 74011250636 HC RX REV CODE- 250/636: Performed by: NURSE PRACTITIONER

## 2019-03-31 PROCEDURE — 77030012893

## 2019-03-31 PROCEDURE — 74011250637 HC RX REV CODE- 250/637: Performed by: NURSE PRACTITIONER

## 2019-03-31 PROCEDURE — 97116 GAIT TRAINING THERAPY: CPT

## 2019-03-31 PROCEDURE — 94760 N-INVAS EAR/PLS OXIMETRY 1: CPT

## 2019-03-31 PROCEDURE — 97535 SELF CARE MNGMENT TRAINING: CPT

## 2019-03-31 PROCEDURE — 80048 BASIC METABOLIC PNL TOTAL CA: CPT

## 2019-03-31 PROCEDURE — 97110 THERAPEUTIC EXERCISES: CPT

## 2019-03-31 PROCEDURE — 65270000029 HC RM PRIVATE

## 2019-03-31 PROCEDURE — 74011250637 HC RX REV CODE- 250/637: Performed by: INTERNAL MEDICINE

## 2019-03-31 PROCEDURE — 36415 COLL VENOUS BLD VENIPUNCTURE: CPT

## 2019-03-31 RX ADMIN — MONTELUKAST SODIUM 10 MG: 10 TABLET, FILM COATED ORAL at 09:21

## 2019-03-31 RX ADMIN — PRAVASTATIN SODIUM 40 MG: 20 TABLET ORAL at 22:02

## 2019-03-31 RX ADMIN — ACETAMINOPHEN 650 MG: 325 TABLET, FILM COATED ORAL at 22:02

## 2019-03-31 RX ADMIN — GABAPENTIN 300 MG: 300 CAPSULE ORAL at 22:01

## 2019-03-31 RX ADMIN — ACETAMINOPHEN 650 MG: 325 TABLET, FILM COATED ORAL at 06:51

## 2019-03-31 RX ADMIN — PANTOPRAZOLE SODIUM 40 MG: 40 TABLET, DELAYED RELEASE ORAL at 06:51

## 2019-03-31 RX ADMIN — AMIODARONE HYDROCHLORIDE 200 MG: 200 TABLET ORAL at 09:21

## 2019-03-31 RX ADMIN — GABAPENTIN 300 MG: 300 CAPSULE ORAL at 06:51

## 2019-03-31 RX ADMIN — CALCIUM CARBONATE 500 MG (1,250 MG)-VITAMIN D3 200 UNIT TABLET 1 TABLET: at 17:34

## 2019-03-31 RX ADMIN — CALCIUM CARBONATE 500 MG (1,250 MG)-VITAMIN D3 200 UNIT TABLET 1 TABLET: at 09:21

## 2019-03-31 RX ADMIN — Medication 5 ML: at 22:01

## 2019-03-31 RX ADMIN — CALCIUM CARBONATE 500 MG (1,250 MG)-VITAMIN D3 200 UNIT TABLET 1 TABLET: at 13:06

## 2019-03-31 RX ADMIN — GABAPENTIN 300 MG: 300 CAPSULE ORAL at 13:07

## 2019-03-31 RX ADMIN — Medication 1 AMPULE: at 22:01

## 2019-03-31 RX ADMIN — TRAMADOL HYDROCHLORIDE 50 MG: 50 TABLET, COATED ORAL at 22:01

## 2019-03-31 RX ADMIN — Medication 5 ML: at 13:07

## 2019-03-31 RX ADMIN — ENOXAPARIN SODIUM 30 MG: 30 INJECTION SUBCUTANEOUS at 13:07

## 2019-03-31 RX ADMIN — DONEPEZIL HYDROCHLORIDE 10 MG: 5 TABLET, FILM COATED ORAL at 22:02

## 2019-03-31 RX ADMIN — LEVOTHYROXINE SODIUM 100 MCG: 100 TABLET ORAL at 06:51

## 2019-03-31 RX ADMIN — DOCUSATE SODIUM 100 MG: 100 CAPSULE, LIQUID FILLED ORAL at 09:20

## 2019-03-31 RX ADMIN — Medication 1 AMPULE: at 09:21

## 2019-03-31 RX ADMIN — DOCUSATE SODIUM 100 MG: 100 CAPSULE, LIQUID FILLED ORAL at 17:34

## 2019-03-31 RX ADMIN — TRAMADOL HYDROCHLORIDE 50 MG: 50 TABLET, COATED ORAL at 09:20

## 2019-03-31 RX ADMIN — DULOXETINE 60 MG: 60 CAPSULE, DELAYED RELEASE ORAL at 17:34

## 2019-03-31 RX ADMIN — DULOXETINE 60 MG: 60 CAPSULE, DELAYED RELEASE ORAL at 09:21

## 2019-03-31 RX ADMIN — ACETAMINOPHEN 650 MG: 325 TABLET, FILM COATED ORAL at 13:06

## 2019-03-31 RX ADMIN — Medication 10 ML: at 06:52

## 2019-03-31 NOTE — PROGRESS NOTES
Hospitalist Progress Note Admit Date:  3/27/2019 12:41 PM  
Name:  Yen Johnson Age:  80 y.o. 
:  1933 MRN:  [id number] PCP:  Zach Gentile MD 
Treatment Team: Attending Provider: Jay Duenas MD; Consulting Provider: Ye Kerr MD; Utilization Review: Levi Banuelos; Care Manager: Abdulkadir Linder; Occupational Therapist: Anna Peña, OTR/L; Primary Nurse: Darnell Salas RN; Physical Therapy Assistant: Marylee Eng; Physical Therapy Assistant: Chasity Trivedi PTA Subjective:  
 
Pt is a 79 yo female with pmh A Fib, dementia, chronic pain who was admitted  with left hip fx.  s/p ORIF 3/28 with Dr. Aleks Jacobson. Pt with intermittent agitation but calm currently. No other issues. Objective:  
 
Patient Vitals for the past 24 hrs: 
 Temp Pulse Resp BP SpO2  
19 0717 98.3 °F (36.8 °C) 76 16 151/74 92 % 19 0434 98.2 °F (36.8 °C) 76 18 116/73 90 % 19 2301 98 °F (36.7 °C) 73 18 118/58 92 % 19 2014 98.3 °F (36.8 °C) 70 18 99/51 91 % 19 1510 98 °F (36.7 °C) 80 18 115/57 91 % 19 1128 98.2 °F (36.8 °C) 64 17 108/61 98 % Oxygen Therapy O2 Sat (%): 92 % (19 0717) Pulse via Oximetry: 74 beats per minute (19 0754) O2 Device: Room air (19 0754) O2 Flow Rate (L/min): 2 l/min (19 1112) No intake or output data in the 24 hours ending 19 1035 General:    Well nourished. Alert. CV:   RRR. No murmur, rub, or gallop. Lungs:   CTAB. No wheezing, rhonchi, or rales. Abdomen:   Soft, nontender, nondistended. Extremities: Warm and dry. No cyanosis or edema. Skin:     No rashes or jaundice. Neuro:  No gross focal deficits Psych:  Awake, alert, disoriented Data Review: 
I have reviewed all labs, meds, telemetry events, and studies from the last 24 hours: 
 
Recent Results (from the past 24 hour(s)) PLEASE READ & DOCUMENT PPD TEST IN 72 HRS  
 Collection Time: 03/30/19  5:00 PM  
Result Value Ref Range PPD Negative Negative  
 mm Induration 0 0 - 5 mm CBC WITH AUTOMATED DIFF Collection Time: 03/31/19  5:08 AM  
Result Value Ref Range WBC 6.9 4.3 - 11.1 K/uL  
 RBC 2.97 (L) 4.05 - 5.2 M/uL HGB 9.5 (L) 11.7 - 15.4 g/dL HCT 30.1 (L) 35.8 - 46.3 % .3 (H) 79.6 - 97.8 FL  
 MCH 32.0 26.1 - 32.9 PG  
 MCHC 31.6 31.4 - 35.0 g/dL  
 RDW 14.4 11.9 - 14.6 % PLATELET 161 521 - 403 K/uL MPV 10.1 9.4 - 12.3 FL ABSOLUTE NRBC 0.00 0.0 - 0.2 K/uL  
 DF AUTOMATED NEUTROPHILS 64 43 - 78 % LYMPHOCYTES 22 13 - 44 % MONOCYTES 11 4.0 - 12.0 % EOSINOPHILS 3 0.5 - 7.8 % BASOPHILS 1 0.0 - 2.0 % IMMATURE GRANULOCYTES 0 0.0 - 5.0 %  
 ABS. NEUTROPHILS 4.4 1.7 - 8.2 K/UL  
 ABS. LYMPHOCYTES 1.5 0.5 - 4.6 K/UL  
 ABS. MONOCYTES 0.7 0.1 - 1.3 K/UL  
 ABS. EOSINOPHILS 0.2 0.0 - 0.8 K/UL  
 ABS. BASOPHILS 0.0 0.0 - 0.2 K/UL  
 ABS. IMM. GRANS. 0.0 0.0 - 0.5 K/UL METABOLIC PANEL, BASIC Collection Time: 03/31/19  5:08 AM  
Result Value Ref Range Sodium 142 136 - 145 mmol/L Potassium 4.1 3.5 - 5.1 mmol/L Chloride 106 98 - 107 mmol/L  
 CO2 32 21 - 32 mmol/L Anion gap 4 (L) 7 - 16 mmol/L Glucose 80 65 - 100 mg/dL BUN 13 8 - 23 MG/DL Creatinine 0.65 0.6 - 1.0 MG/DL  
 GFR est AA >60 >60 ml/min/1.73m2 GFR est non-AA >60 >60 ml/min/1.73m2 Calcium 8.4 8.3 - 10.4 MG/DL All Micro Results Procedure Component Value Units Date/Time MSSA/MRSA SC BY PCR, NASAL SWAB [[id number]]  (Abnormal) Collected:  03/27/19 1530 Order Status:  Completed Specimen:  Swab Updated:  03/27/19 7251 Special Requests: NO SPECIAL REQUESTS Culture result: MRSA target DNA detected, SA target DNA detected. A positive test result does not necessarily indicate the presence of viable organisms.  It is however, presumptive for the presence of MRSA or SA.  
     
      
 RESULTS VERIFIED, PHONED TO AND READ BACK BY 
Cheryl Newton RN @4727 HF 3/27/19 No results found for this visit on 03/27/19. Current Meds: 
Current Facility-Administered Medications Medication Dose Route Frequency  0.9% sodium chloride infusion 250 mL  250 mL IntraVENous PRN  
 0.9% sodium chloride infusion 250 mL  250 mL IntraVENous PRN  
 haloperidol lactate (HALDOL) injection 0.5 mg  0.5 mg IntraMUSCular Q4H PRN  
 sodium chloride (NS) flush 5-40 mL  5-40 mL IntraVENous Q8H  
 sodium chloride (NS) flush 5-40 mL  5-40 mL IntraVENous PRN  
 acetaminophen (TYLENOL) tablet 650 mg  650 mg Oral Q8H  
 ondansetron (ZOFRAN) injection 4 mg  4 mg IntraVENous Q4H PRN  
 docusate sodium (COLACE) capsule 100 mg  100 mg Oral BID  alum-mag hydroxide-simeth (MYLANTA) oral suspension 30 mL  30 mL Oral Q4H PRN  
 calcium-vitamin D (OS-AMANDA) 500 mg-200 unit tablet  1 Tab Oral TID WITH MEALS  enoxaparin (LOVENOX) injection 30 mg  30 mg SubCUTAneous Q24H  
 oxyCODONE IR (ROXICODONE) tablet 5 mg  5 mg Oral Q4H PRN  
 traMADol (ULTRAM) tablet 50 mg  50 mg Oral Q6H PRN  
 amiodarone (CORDARONE) tablet 200 mg  200 mg Oral DAILY  donepezil (ARICEPT) tablet 10 mg  10 mg Oral QHS  DULoxetine (CYMBALTA) capsule 60 mg  60 mg Oral BID  
 gabapentin (NEURONTIN) capsule 300 mg  300 mg Oral TID  levothyroxine (SYNTHROID) tablet 100 mcg  100 mcg Oral ACB  montelukast (SINGULAIR) tablet 10 mg  10 mg Oral DAILY  pantoprazole (PROTONIX) tablet 40 mg  40 mg Oral ACB  pravastatin (PRAVACHOL) tablet 40 mg  40 mg Oral QHS  alcohol 62% (NOZIN) nasal  1 Ampule  1 Ampule Topical Q12H Other Studies (last 24 hours): No results found. Assessment and Plan:  
 
Hospital Problems as of 3/31/2019 Date Reviewed: 3/5/2019 Codes Class Noted - Resolved POA * (Principal) Hip fracture, left ([facility] [address] ICD-10-CM: N20.206W ICD-9-CM: 820.8  3/27/2019 - Present Yes Peripheral polyneuropathy ICD-10-CM: G62.9 ICD-9-CM: 356.9  5/21/2018 - Present Yes Alzheimer's dementia without behavioral disturbance (Chronic) ICD-10-CM: G30.9, F02.80 ICD-9-CM: 331.0, 294.10  12/18/2017 - Present Yes Acquired hypothyroidism ICD-10-CM: E03.9 ICD-9-CM: 244.9  12/18/2017 - Present Yes Atrial fibrillation ([address] ICD-10-CM: I48.91 
ICD-9-CM: 427.31  8/9/2013 - Present Yes HTN (hypertension) (Chronic) ICD-10-CM: I10 
ICD-9-CM: 401.9  11/15/2010 - Present Yes Plan: 
 
Left intertrochanteric hip fx S/p ORIF 3/28 with Dr. Severa Art - Cont PT/OT, pain management, DVT prophylaxis per ortho Acute post op anemia Hgb 6.4, up to 9.9 after 1 unit pRBCs. Now stable. A Fib Rate controlled on Amio. CHADS 2 
- does not appear that she is on anticoagulation Fibromyalgia, neuropathy Cont home regimen Neurontin, Cymbalta Dementia Previously living at home alone but doubt this will be possible going forward. Her dementia appears too advanced. SW has discussed long term placement with family following STR. Dispo - STR in 1-2 days - pending auth Diet:  DIET REGULAR 
DVT ppx:  Lovenox Signed: 
Mann Guerra MD

## 2019-03-31 NOTE — PROGRESS NOTES
Pt has had 2 dressing changes to the surgical incision. Dressing saturated in blood, Orthopedic NP notified. Verbal order obtained to with hold lovenox injection for 24 hours and continue to monitor surgical incision.

## 2019-03-31 NOTE — PROGRESS NOTES
Problem: Mobility Impaired (Adult and Pediatric) Goal: *Acute Goals and Plan of Care (Insert Text) Description STG: 
(1.)Ms. Richie Collins will move from supine to sit and sit to supine  with CONTACT GUARD ASSIST within 3 treatment day(s). (2.)Ms. Richie Collins will transfer from bed to chair and chair to bed with CONTACT GUARD ASSIST using the least restrictive device within 3 treatment day(s). (3.)Ms. Richie Collins will ambulate with CONTACT GUARD ASSIST for 30 feet with the least restrictive device within 3 treatment day(s). LTG: 
(1.)Ms. Richie Collins will move from supine to sit and sit to supine  in bed with STAND BY ASSIST within 7 treatment day(s). (2.)Ms. Richie Collins will transfer from bed to chair and chair to bed with STAND BY ASSIST using the least restrictive device within 7 treatment day(s). (3.)Ms. Richie Collins will ambulate with STAND BY ASSIST for 100+ feet with the least restrictive device within 7 treatment day(s). ________________________________________________________________________________________________ Outcome: Progressing Towards Goal 
 
 
PHYSICAL THERAPY: Daily Note and PM 3/31/2019 INPATIENT: PT Visit Days : 5 Payor: [facility] [place] MEDICARE / Plan: [place] [facility] [place] MEDICARE HMO/PPO / Product Type: Managed Care Medicare /   
  
NAME/AGE/GENDER: Yamileth Peter is a 80 y.o. female PRIMARY DIAGNOSIS: Hip fracture, left ([address] [S72.002A] Hip fracture, left ([facility] [address] Hip fracture, left ([facility] [address] Procedure(s) (LRB): LEFT FEMUR INSERTION INTRA MEDULLARY NAIL (Left) 3 Days Post-Op ICD-10: Treatment Diagnosis:  
 · Generalized Muscle Weakness (M62.81) · Difficulty in walking, Not elsewhere classified (R26.2) · Repeated Falls (R29.6) · History of falling (Z91.81) Precaution/Allergies: 
Patient has no known allergies. WBAT L LE  
ASSESSMENT:  
Ms. Richie Collins was sitting up in recliner chair upon contact and agreeable to PT.  Patient is pleasantly confused this afternoon, following commands and demonstrating appropriate behavior. Patient participates in therapeutic strengthening exercises to improve functional strength for transfers, gait and overall mobility. Patient requires cues and assistance to perform exercises correctly. Patient then transfers to standing with min assist and cues for hand placement technique. Once standing patient able to perform gait training x 15' with use of rolling walker, min assist, and below cues in gait section. Patient returns to EOB and to supine with min assist. Overall slow progress towards physical therapy goals. Patient's goals listed above are still appropriate. Will continue skilled PT to address remaining deficits. This section established at most recent assessment PROBLEM LIST (Impairments causing functional limitations): 1. Decreased Strength 2. Decreased ADL/Functional Activities 3. Decreased Transfer Abilities 4. Decreased Ambulation Ability/Technique 5. Decreased Balance 6. Increased Pain 7. Decreased Flexibility/Joint Mobility 8. Decreased Knowledge of Precautions 9. Decreased [place] with Home Exercise Program 
10. Decreased Cognition INTERVENTIONS PLANNED: (Benefits and precautions of physical therapy have been discussed with the patient.) 1. Balance Exercise 2. Bed Mobility 3. Family Education 4. Gait Training 5. Home Exercise Program (HEP) 6. Neuromuscular Re-education/Strengthening 7. Range of Motion (ROM) 8. Therapeutic Activites 9. Therapeutic Exercise/Strengthening 10. Transfer Training TREATMENT PLAN: Frequency/Duration: twice daily for duration of hospital stay Rehabilitation Potential For Stated Goals: Good RECOMMENDED REHABILITATION/EQUIPMENT: (at time of discharge pending progress): Due to the probability of continued deficits (see above) this patient will likely need continued skilled physical therapy after discharge. Equipment:  
? None at this time HISTORY:  
 History of Present Injury/Illness (Reason for Referral): S/p LEFT FEMUR INSERTION INTRA MEDULLARY NAIL Past Medical History/Comorbidities: Ms. Gracia Vaz  has a past medical history of Arthritis, Atrial fibrillation ([address] Dementia, Depression, Heart failure ([address] Hyperlipidemia, Hypertension, Hypothyroidism, and Other ill-defined conditions(799.89). Ms. Gracia Vaz  has a past surgical history that includes hx orthopaedic; hx cholecystectomy; hx tubal ligation; hx hysterectomy; hx tonsillectomy; and hx small bowel resection (8/8/13). Social History/Living Environment:  
Home Environment: Private residence # Steps to Enter: 2 Rails to Enter: Yes One/Two Story Residence: One story Living Alone: Yes Support Systems: (states she doesn't have any family) Patient Expects to be Discharged to[de-identified] Rehabilitation facility Current DME Used/Available at Home: Abdulkadir Alberto, [address] [facility] Luis chair, casey Herbert Prior Level of Function/Work/Activity: 
Independent, drives, history of falling Number of Personal Factors/Comorbidities that affect the Plan of Care: 3+: HIGH COMPLEXITY EXAMINATION:  
Most Recent Physical Functioning:  
Gross Assessment: 
  
         
  
Posture: 
  
Balance: 
Sitting: Impaired Sitting - Static: Good (unsupported) Sitting - Dynamic: Fair (occasional) Standing: Impaired Standing - Static: Fair Standing - Dynamic : Fair Bed Mobility: 
Sit to Supine: Minimum assistance Wheelchair Mobility: 
  
Transfers: 
Sit to Stand: Minimum assistance Stand to Sit: Minimum assistance Gait: 
Left Side Weight Bearing: As tolerated Base of Support: Center of gravity altered;Narrowed Speed/Ashley: Shuffled; Slow Step Length: Left shortened;Right shortened Gait Abnormalities: Decreased step clearance;Trunk sway increased; Step to gait Distance (ft): 15 Feet (ft) Assistive Device: Walker, rolling Ambulation - Level of Assistance: Minimal assistance Interventions: Safety awareness training; Tactile cues; Verbal cues Body Structures Involved: 1. Nerves 2. Bones 3. Joints 4. Muscles 5. Ligaments Body Functions Affected: 1. Mental 
2. Sensory/Pain 3. Cardio 4. Respiratory 5. Neuromusculoskeletal 
6. Movement Related Activities and Participation Affected: 1. Learning and Applying Knowledge 2. General Tasks and Demands 3. Mobility 4. Self Care 5. Domestic Life 6. Interpersonal Interactions and Relationships 7. Community, Social and [place] Number of elements that affect the Plan of Care: 4+: HIGH COMPLEXITY CLINICAL PRESENTATION:  
Presentation: Evolving clinical presentation with changing clinical characteristics: MODERATE COMPLEXITY CLINICAL DECISION MAKING:  
[facility] AM-PAC 6 Clicks Basic Mobility Inpatient Short Form How much difficulty does the patient currently have. .. Unable A Lot A Little None 1. Turning over in bed (including adjusting bedclothes, sheets and blankets)? ? 1   ? 2   ? 3   ? 4  
2. Sitting down on and standing up from a chair with arms ( e.g., wheelchair, bedside commode, etc.)   ? 1   ? 2   ? 3   ? 4  
3. Moving from lying on back to sitting on the side of the bed?   ? 1   ? 2   ? 3   ? 4 How much help from another person does the patient currently need. .. Total A Lot A Little None 4. Moving to and from a bed to a chair (including a wheelchair)? ? 1   ? 2   ? 3   ? 4  
5. Need to walk in hospital room? ? 1   ? 2   ? 3   ? 4  
6. Climbing 3-5 steps with a railing? ? 1   ? 2   ? 3   ? 4  
© 2007, Trustees of [facility], under license to [employer]. All rights reserved Score:  Initial: 16 Most Recent: X (Date: -- ) Interpretation of Tool:  Represents activities that are increasingly more difficult (i.e. Bed mobility, Transfers, Gait). Medical Necessity:    
· Patient is expected to demonstrate progress in strength, range of motion, balance, coordination and functional technique ·  to decrease assistance required with gait, transfers, and functional mobility. · . Reason for Services/Other Comments: 
· Patient continues to require skilled intervention due to decreased strength, decreased balance, decreased functional tolerance, decreased cardiopulmonary endurance affecting participation in basic ADLs and functional tasks · . Use of outcome tool(s) and clinical judgement create a POC that gives a: Clear prediction of patient's progress: LOW COMPLEXITY  
  
 
 
 
TREATMENT:  
  
Pre-treatment Symptoms/Complaints:  No complaints Pain: Initial:  
Pain Intensity 1: 0   Post Session:  0 visual  
 
Therapeutic Activity: (     minutes): Therapeutic activities including Bed transfers, Chair transfers, Ambulation on level ground and cues for ease and safety of transfers  to improve mobility, strength, balance and coordination. Required minimal Safety awareness training; Tactile cues; Verbal cues to promote static and dynamic balance in standing and promote coordination of bilateral, upper extremity(s), lower extremity(s). Gait Training (  10 Minutes):  Gait training to improve and/or restore physical functioning as related to mobility, strength and balance. Ambulated 15 Feet (ft) with Minimal assistance using a Walker, rolling and moderate Safety awareness training; Tactile cues; Verbal cues related to their sequencing, walker manipulation, posture, step length, weight shifts, UE support on rolling walker, posture training, step length, and proximity to rolling walkedr to promote proper body alignment, promote proper body posture, promote proper body mechanics and promote proper body breathing techniques. Instruction in performance of the above deficits to correct overall gait quality and functional mobility. Therapeutic Exercise: (  13 minutes):  Exercises per grid below to improve mobility and strength.   Required minimal visual, verbal and manual cues to promote proper body posture and promote proper body mechanics. Progressed range and repetitions as indicated. Date: 
3/31/19 Date: 
3/31/19 ACTIVITY/EXERCISE AM PM AM   
Ambulation:           Distance Device Duration Seated Heel Raises X 20 B  2x20B A Seated Toe Raises X 20 B  2x20B A Seated Long Arc Quads X 10 B  
AA to L  2x15B A Seated Marching X 10 B    AA to L  x15B AA-L, A-R Seated Hip Abduction X 10 B  x15B AA-L, A-R   
      
      
      
B = bilateral; AA = active assistive; A = active; P = passive Braces/Orthotics/Lines/Etc:  
· room air Treatment/Session Assessment:   
· Response to Treatment:  See above · Interdisciplinary Collaboration:  
o Physical Therapy Assistant 
o Registered Nurse · After treatment position/precautions:  
o Supine in bed 
o Bed alarm/tab alert on 
o Bed/Chair-wheels locked 
o Bed in low position 
o Call light within reach 
o RN notified · Compliance with Program/Exercises: Will assess as treatment progresses · Recommendations/Intent for next treatment session: \"Next visit will focus on advancements to more challenging activities and reduction in assistance provided\". Total Treatment Duration: PT Patient Time In/Time Out Time In: 1336 Time Out: 0430 Mir Sanchez PTA

## 2019-03-31 NOTE — PROGRESS NOTES
ORTHO Sub:  Pt without complaints. Obj:   
Vitals:  
 03/31/19 0434 03/31/19 0717 03/31/19 1007 03/31/19 1106 BP: 116/73 151/74  116/69 Pulse: 76 76  95 Resp: 18 16  18 Temp: 98.2 °F (36.8 °C) 98.3 °F (36.8 °C)  97.8 °F (36.6 °C) SpO2: 90% 92% 94% 90% Weight:      
Height:      
  
  
Recent Labs  
  03/31/19 
0508 03/30/19 
0524 03/29/19 
1452 03/29/19 
0340 WBC 6.9 10.2  --  9.1 HGB 9.5* 9.8* 9.9* 6.4* Dressing is dry and intact. Calves are soft and non tender. There is no redness or appearance of infection. N/V status is good. Assessment:   Slow progress with therapy, awaiting rehab placement Plan:   Continue with Physical Therapy. Watch labs            Didier Goff MD

## 2019-03-31 NOTE — PROGRESS NOTES
Problem: Mobility Impaired (Adult and Pediatric) Goal: *Acute Goals and Plan of Care (Insert Text) Description STG: 
(1.)Ms. Zana Irving will move from supine to sit and sit to supine  with CONTACT GUARD ASSIST within 3 treatment day(s). (2.)Ms. Zana Irving will transfer from bed to chair and chair to bed with CONTACT GUARD ASSIST using the least restrictive device within 3 treatment day(s). (3.)Ms. Zana Irving will ambulate with CONTACT GUARD ASSIST for 30 feet with the least restrictive device within 3 treatment day(s). LTG: 
(1.)Ms. Zana Irving will move from supine to sit and sit to supine  in bed with STAND BY ASSIST within 7 treatment day(s). (2.)Ms. Zana Irving will transfer from bed to chair and chair to bed with STAND BY ASSIST using the least restrictive device within 7 treatment day(s). (3.)Ms. Zana Irving will ambulate with STAND BY ASSIST for 100+ feet with the least restrictive device within 7 treatment day(s). ________________________________________________________________________________________________ Outcome: Progressing Towards Goal 
 
 
PHYSICAL THERAPY: Daily Note and AM 3/31/2019 INPATIENT: PT Visit Days : 4 Payor: [facility] MEDICARE / Plan: [place] MEDICARE HMO/PPO / Product Type: Managed Care Medicare /   
  
NAME/AGE/GENDER: Melissa Hammond is a 80 y.o. female PRIMARY DIAGNOSIS: Hip fracture, left ([address] [S72.002A] Hip fracture, left ([address] Hip fracture, left ([facility] [address] Procedure(s) (LRB): LEFT FEMUR INSERTION INTRA MEDULLARY NAIL (Left) 3 Days Post-Op ICD-10: Treatment Diagnosis:  
 · Generalized Muscle Weakness (M62.81) · Difficulty in walking, Not elsewhere classified (R26.2) · Repeated Falls (R29.6) · History of falling (Z91.81) Precaution/Allergies: 
Patient has no known allergies. WBAT L LE  
ASSESSMENT:  
Ms. Zana Irving presents supine in bed. Following directions to sit at EOB with mod assist and additional time.    Pt stood and ambulated about 6' in room to [facility]. Sat and was able to void. Required total assist for hygiene. Pt stood at [facility] several minutes while brief was being secured. Once brief secured she was able to ambulate additional 6' to recliner chair WBAT L and min assist and repeated VC's for safety. She did not experience any LOB today but is impulsive. Once in chair she was given a couple of minutes to rest and then she performed bilateral LE ex following verbal and visual cues. Pt continues to ambulate with an antalgic gait pattern and shuffling steps. Slow progress towards goals. Will continue with POC. This section established at most recent assessment PROBLEM LIST (Impairments causing functional limitations): 1. Decreased Strength 2. Decreased ADL/Functional Activities 3. Decreased Transfer Abilities 4. Decreased Ambulation Ability/Technique 5. Decreased Balance 6. Increased Pain 7. Decreased Flexibility/Joint Mobility 8. Decreased Knowledge of Precautions 9. Decreased [place] with Home Exercise Program 
10. Decreased Cognition INTERVENTIONS PLANNED: (Benefits and precautions of physical therapy have been discussed with the patient.) 1. Balance Exercise 2. Bed Mobility 3. Family Education 4. Gait Training 5. Home Exercise Program (HEP) 6. Neuromuscular Re-education/Strengthening 7. Range of Motion (ROM) 8. Therapeutic Activites 9. Therapeutic Exercise/Strengthening 10. Transfer Training TREATMENT PLAN: Frequency/Duration: twice daily for duration of hospital stay Rehabilitation Potential For Stated Goals: Good RECOMMENDED REHABILITATION/EQUIPMENT: (at time of discharge pending progress): Due to the probability of continued deficits (see above) this patient will likely need continued skilled physical therapy after discharge. Equipment:  
? None at this time HISTORY:  
History of Present Injury/Illness (Reason for Referral): S/p LEFT FEMUR INSERTION INTRA MEDULLARY NAIL Past Medical History/Comorbidities: Ms. Elina Stern  has a past medical history of Arthritis, Atrial fibrillation ([facility] [address] Dementia, Depression, Heart failure ([address] Hyperlipidemia, Hypertension, Hypothyroidism, and Other ill-defined conditions(799.89). Ms. Elina Stern  has a past surgical history that includes hx orthopaedic; hx cholecystectomy; hx tubal ligation; hx hysterectomy; hx tonsillectomy; and hx small bowel resection (8/8/13). Social History/Living Environment:  
Home Environment: Private residence # Steps to Enter: 2 Rails to Enter: Yes One/Two Story Residence: One story Living Alone: Yes Support Systems: (states she doesn't have any family) Patient Expects to be Discharged to[de-identified] Rehabilitation facility Current DME Used/Available at Home: Luellen Canavan, [address] [facility] chair, Mary Patrick, casey Prior Level of Function/Work/Activity: 
Independent, drives, history of falling Number of Personal Factors/Comorbidities that affect the Plan of Care: 3+: HIGH COMPLEXITY EXAMINATION:  
Most Recent Physical Functioning:  
Gross Assessment: 
  
         
  
Posture: 
  
Balance: 
Sitting - Static: Good (unsupported) Sitting - Dynamic: Fair (occasional) Standing - Static: Fair Standing - Dynamic : Fair Bed Mobility: 
Supine to Sit: Minimum assistance; Additional time Wheelchair Mobility: 
  
Transfers: 
Sit to Stand: Minimum assistance Stand to Sit: Minimum assistance Gait: 
Left Side Weight Bearing: As tolerated Base of Support: Center of gravity altered;Narrowed Speed/Ashley: Shuffled; Slow Step Length: Left shortened;Right shortened Stance: Left decreased;Right decreased Gait Abnormalities: Antalgic;Decreased step clearance;Shuffling gait; Step to gait Distance (ft): 6 Feet (ft)(x 2 w/ seated rest) Assistive Device: Walker, rolling Ambulation - Level of Assistance: Minimal assistance Interventions: Manual cues; Safety awareness training;Verbal cues Body Structures Involved: 1. Nerves 2. Bones 3. Joints 4. Muscles 5. Ligaments Body Functions Affected: 1. Mental 
2. Sensory/Pain 3. Cardio 4. Respiratory 5. Neuromusculoskeletal 
6. Movement Related Activities and Participation Affected: 1. Learning and Applying Knowledge 2. General Tasks and Demands 3. Mobility 4. Self Care 5. Domestic Life 6. Interpersonal Interactions and Relationships 7. Community, Social and [place] Number of elements that affect the Plan of Care: 4+: HIGH COMPLEXITY CLINICAL PRESENTATION:  
Presentation: Evolving clinical presentation with changing clinical characteristics: MODERATE COMPLEXITY CLINICAL DECISION MAKING:  
[facility] 6 Clicks Basic Mobility Inpatient Short Form How much difficulty does the patient currently have. .. Unable A Lot A Little None 1. Turning over in bed (including adjusting bedclothes, sheets and blankets)? ? 1   ? 2   ? 3   ? 4  
2. Sitting down on and standing up from a chair with arms ( e.g., wheelchair, bedside commode, etc.)   ? 1   ? 2   ? 3   ? 4  
3. Moving from lying on back to sitting on the side of the bed?   ? 1   ? 2   ? 3   ? 4 How much help from another person does the patient currently need. .. Total A Lot A Little None 4. Moving to and from a bed to a chair (including a wheelchair)? ? 1   ? 2   ? 3   ? 4  
5. Need to walk in hospital room? ? 1   ? 2   ? 3   ? 4  
6. Climbing 3-5 steps with a railing? ? 1   ? 2   ? 3   ? 4  
© 2007, Trustees of [facility], under license to [employer]. All rights reserved Score:  Initial: 16 Most Recent: X (Date: -- ) Interpretation of Tool:  Represents activities that are increasingly more difficult (i.e. Bed mobility, Transfers, Gait). Medical Necessity:    
· Patient is expected to demonstrate progress in strength, range of motion, balance, coordination and functional technique ·  to decrease assistance required with gait, transfers, and functional mobility. · . Reason for Services/Other Comments: 
· Patient continues to require skilled intervention due to decreased strength, decreased balance, decreased functional tolerance, decreased cardiopulmonary endurance affecting participation in basic ADLs and functional tasks · . Use of outcome tool(s) and clinical judgement create a POC that gives a: Clear prediction of patient's progress: LOW COMPLEXITY  
  
 
 
 
TREATMENT:  
  
Pre-treatment Symptoms/Complaints:  No complaints Pain: Initial:  
Pain Intensity 1: 3   Post Session:  0 visual  
 
Therapeutic Activity: (    17 minutes): Therapeutic activities including Bed transfers, Chair transfers, Ambulation on level ground and cues for ease and safety of transfers  to improve mobility, strength, balance and coordination. Required minimal Manual cues; Safety awareness training;Verbal cues to promote static and dynamic balance in standing and promote coordination of bilateral, upper extremity(s), lower extremity(s). Therapeutic Exercise: (  09 minutes):  Exercises per grid below to improve mobility and strength. Required minimal visual, verbal and manual cues to promote proper body posture and promote proper body mechanics. Progressed range and repetitions as indicated. Date: 
3/29/19 Date: 
3/30/19 Date: 
3/31/19 ACTIVITY/EXERCISE AM PM AM PM AM PM  
Ambulation:           Distance Device Duration 3' to chair with RW and min A Seated Heel Raises X 10 B  X 10 B  X 20 B Seated Toe Raises X 10 B  X 10 B  X 20 B Seated Long Arc Quads X 10 B, AA-L  X 10 B  X 10 B  
AA to L Seated Marching X 10 B, AA-L    X 10 B    AA to L Seated Hip Abduction X 10 B, AA-L  X 10 B  X 10 B   
        
B = bilateral; AA = active assistive; A = active; P = passive Braces/Orthotics/Lines/Etc:  
· room air Treatment/Session Assessment:   
· Response to Treatment:  Smiling, following most commands today. · Interdisciplinary Collaboration: o Physical Therapy Assistant 
o Registered Nurse 
o Certified Nursing Assistant/Patient Care Technician · After treatment position/precautions:  
o Up in chair 
o Bed alarm/tab alert on 
o Bed/Chair-wheels locked 
o Bed in low position 
o Call light within reach 
o RN notified 
o lap belt · Compliance with Program/Exercises: Will assess as treatment progresses · Recommendations/Intent for next treatment session: \"Next visit will focus on advancements to more challenging activities and reduction in assistance provided\". Total Treatment Duration: PT Patient Time In/Time Out Time In: 9142 Time Out: 1024 Martha Maya, PTA

## 2019-03-31 NOTE — PROGRESS NOTES
Problem: Self Care Deficits Care Plan (Adult) Goal: *Acute Goals and Plan of Care (Insert Text) Description 1. Patient will complete lower body bathing and dressing with minimal assistance and adaptive equipment as needed. 2. Patient will complete toileting with minimal assistance. 3. Patient will tolerate 25 minutes of OT treatment with 1-2 rest breaks to increase activity tolerance for ADLs. 4. Patient will complete functional transfers with supervision and adaptive equipment as needed. 5. Patient will complete functional mobility for household distances with CGA and minimal cues for safety. Timeframe: 7 visits Outcome: Progressing Towards Goal 
  
 
OCCUPATIONAL THERAPY: Daily Note 3/31/2019 INPATIENT:   
Payor: [facility] [place] / Plan[place] [facility] MEDIC[place] HMO/PPO / Product Type: [employer] Care Medicare /  
 CATY MCCALL WBAT 
NAME/AGE/GENDER: Yen Johnson is a 80 y.o. female PRIMARY DIAGNOSIS:  Hip fracture, left ([address] [S72.002A] Hip fracture, left ([address] Hip fracture, left ([address] Procedure(s) (LRB): LEFT FEMUR INSERTION INTRA MEDULLARY NAIL (Left) 3 Days Post-Op ICD-10: Treatment Diagnosis:  
 · Pain in left hip (M25.552) · Stiffness of Left Hip, Not elsewhere classified (M25.652) · Generalized Muscle Weakness (M62.81) · Other lack of cordination (R27.8) · History of falling (Z91.81) Precautions/Allergies: 
   Patient has no known allergies. ASSESSMENT:  
Ms. Niurka Rosario sitting up in chair with lap belt secured/chair alarm in place. Ms. Niurka Rosario was agreeable to OT for B UE exercise and functional mobility for ADL/ADL. She continues with unsteadiness on her feet. Asking what day of the week it is. Oriented only to person. Continue OT per plan of care. This section established at most recent assessment PROBLEM LIST (Impairments causing functional limitations): 1. Decreased Strength 2. Decreased ADL/Functional Activities 3. Decreased Transfer Abilities 4. Decreased Ambulation Ability/Technique 5. Decreased Balance 6. Increased Pain 7. Decreased Activity Tolerance 8. Increased Fatigue 9. Decreased Flexibility/Joint Mobility 10. Decreased [place] with Home Exercise Program 
11. Decreased Cognition INTERVENTIONS PLANNED: (Benefits and precautions of occupational therapy have been discussed with the patient.) 1. Activities of daily living training 2. Adaptive equipment training 3. Balance training 4. Clothing management 5. Cognitive training 6. Donning&doffing training 7. Neuromuscular re-eduation 8. Therapeutic activity 9. Therapeutic exercise TREATMENT PLAN: Frequency/Duration: Follow patient 6 times per week to address above goals. Rehabilitation Potential For Stated Goals: Good RECOMMENDED REHABILITATION/EQUIPMENT: (at time of discharge pending progress): Due to the probability of continued deficits (see above) this patient will likely need continued skilled occupational therapy after discharge. Equipment: ? TBD   
    
 
 
 
OCCUPATIONAL PROFILE AND HISTORY:  
History of Present Injury/Illness (Reason for Referral): 
See H&P Past Medical History/Comorbidities: Ms. Katiuska Jung  has a past medical history of Arthritis, Atrial fibrillation ([facility] [address] Dementia, Depression, Heart failure ([facility] [address] Hyperlipidemia, Hypertension, Hypothyroidism, and Other ill-defined conditions(799.89). Ms. Katiuska Jung  has a past surgical history that includes hx orthopaedic; hx cholecystectomy; hx tubal ligation; hx hysterectomy; hx tonsillectomy; and hx small bowel resection (8/8/13). Social History/Living Environment:  
Home Environment: Private residence # Steps to Enter: 2 Rails to Enter: Yes One/Two Story Residence: One story Living Alone: Yes Support Systems: (states she doesn't have any family) Patient Expects to be Discharged to[de-identified] Rehabilitation facility Current DME Used/Available at Home: Braden Canavan, [address] [facility] Luis chair, casey Martini Prior Level of Function/Work/Activity: 
Pt lives alone in a single story home  and reports that she is independent with ADL at baseline. Pt states she drives occasionally and receives \"Meals on [employer]". Pt appears confused and is hard of hearing, therefore pt's PLOF was difficult to obtain. Personal Factors:   
      Social Background:   Lives alone Past/Current Experience:  hx of falling; dementia per chart Other factors that influence how disability is experienced by the patient:  multiple co-morbidities Number of Personal Factors/Comorbidities that affect the Plan of Care: Extensive review of physical, cognitive, and psychosocial performance (3+):  HIGH COMPLEXITY ASSESSMENT OF OCCUPATIONAL PERFORMANCE[de-identified]  
Activities of Daily Living:  
Basic ADLs (From Assessment) Complex ADLs (From Assessment) Feeding: Stand-by assistance Oral Facial Hygiene/Grooming: Minimum assistance Bathing: Moderate assistance Upper Body Dressing: Minimum assistance Lower Body Dressing: Maximum assistance Toileting: Maximum assistance Instrumental ADL Meal Preparation: Total assistance Homemaking: Total assistance Grooming/Bathing/Dressing Activities of Daily Living Grooming Grooming Assistance: Contact guard assistance(standing at sinkside - applying lip balm with standby assist) Brushing/Combing Hair: Contact guard assistance Cognitive Retraining Safety/Judgement: Fall prevention Feeding Feeding Assistance: Stand-by assistance Container Management: Stand-by assistance Food to Mouth: Supervision/set-up Drink to Mouth: Supervision/set-up Bed/Mat Mobility Supine to Sit: Minimum assistance; Additional time Sit to Stand: Contact guard assistance Stand to Sit: Contact guard assistance Scooting: Supervision Most Recent Physical Functioning:  
Gross Assessment: 
  
         
  
Posture: 
  
Balance: 
Sitting - Static: Good (unsupported) Sitting - Dynamic: Fair (occasional) Standing - Static: Fair Standing - Dynamic : Fair Bed Mobility: 
Supine to Sit: Minimum assistance; Additional time Scooting: Supervision Wheelchair Mobility: 
  
Transfers: 
Sit to Stand: Contact guard assistance Stand to Sit: Contact guard assistance Patient Vitals for the past 6 hrs: 
 BP BP Patient Position SpO2 Pulse 03/31/19 0717 151/74 At rest 92 % 76  
03/31/19 1007   94 %   
03/31/19 1106 116/69 Sitting 90 % 95 Mental Status Neurologic State: Alert, Confused Orientation Level: Oriented to person, Disoriented to time, Disoriented to place Cognition: Impaired decision making Perception: Appears intact Perseveration: No perseveration noted Safety/Judgement: Fall prevention Physical Skills Involved: 1. Range of Motion 2. Balance 3. Strength 4. Activity Tolerance 5. Pain (acute) Cognitive Skills Affected (resulting in the inability to perform in a timely and safe manner): 1. Executive Function 2. Short Term Recall 3. Sustained Attention 4. Divided Attention 5. Comprehension Psychosocial Skills Affected: 1. Habits/Routines 2. Environmental Adaptation 3. Self-Awareness Number of elements that affect the Plan of Care: 5+:  HIGH COMPLEXITY CLINICAL DECISION MAKING:  
[facility] [facility]licks Daily Activity Inpatient Short Form How much help from another person does the patient currently need. .. Total A Lot A Little None 1. Putting on and taking off regular lower body clothing? ? 1   ? 2   ? 3   ? 4  
2. Bathing (including washing, rinsing, drying)? ? 1   ? 2   ? 3   ? 4  
3. Toileting, which includes using toilet, bedpan or urinal?   ? 1   ? 2   ? 3   ? 4  
4. Putting on and taking off regular upper body clothing? ? 1   ? 2   ? 3   ? 4  
5. Taking care of personal grooming such as brushing teeth? ? 1   ? 2   ? 3   ? 4  
6. Eating meals?    ? 1   ? 2   ? 3   ? 4  
 © 2007, Trustees of [facility], under license to [employer]. All rights reserved Score:  Initial: 15 Most Recent: X (Date: -- ) Interpretation of Tool:  Represents activities that are increasingly more difficult (i.e. Bed mobility, Transfers, Gait). Medical Necessity:    
· Patient demonstrates good ·  rehab potential due to higher previous functional level. Reason for Services/Other Comments: 
· Patient continues to require skilled intervention due to decreased independence with ADL/functional transfers · . Use of outcome tool(s) and clinical judgement create a POC that gives a: LOW COMPLEXITY  
 
 
 
TREATMENT:  
(In addition to Assessment/Re-Assessment sessions the following treatments were rendered) Pre-treatment Symptoms/Complaints:   
Pain: Initial:  
Pain Intensity 1: (no complaints of pain)  Post Session:  same Self Care: (15 minutes): Procedure(s) (per grid) utilized to improve and/or restore self-care/home management as related to grooming and self feeding. Required minimal visual, verbal, manual and tactile cueing to facilitate activities of daily living skills. Therapeutic Exercise: (  8 minutes):  Exercises per grid below to improve mobility, strength and balance. Required moderate visual, verbal, manual and tactile cues to promote proper body alignment, promote proper body posture and promote proper body mechanics. Progressed resistance, range, repetitions and complexity of movement as indicated. Patient completed overhead bilateral gripping 10-15 reps, shoulder circumduction shoulders abducted to 90° X 1 minute, and \"punches\" X 10-15 reps. Braces/Orthotics/Lines/Etc:  
· IV Treatment/Session Assessment:   
Response to Treatment:  Tolerated well without complications. · Interdisciplinary Collaboration:  
o Occupational Therapist 
o Registered Nurse · After treatment position/precautions:  
o Up in chair 
o Bed alarm/tab alert on 
o Call light within reach o RN notified · Compliance with Program/Exercises: Will assess as treatment progresses. · Recommendations/Intent for next treatment session: \"Next visit will focus on advancements to more challenging activities and reduction in assistance provided\". Total Treatment Duration: OT Patient Time In/Time Out Time In: 1152 Time Out: 2093 DENISE Moore, OTR/L

## 2019-04-01 VITALS
TEMPERATURE: 98.5 F | WEIGHT: 116 LBS | HEART RATE: 73 BPM | OXYGEN SATURATION: 90 % | RESPIRATION RATE: 18 BRPM | SYSTOLIC BLOOD PRESSURE: 117 MMHG | HEIGHT: 62 IN | DIASTOLIC BLOOD PRESSURE: 66 MMHG | BODY MASS INDEX: 21.35 KG/M2

## 2019-04-01 PROCEDURE — 97110 THERAPEUTIC EXERCISES: CPT

## 2019-04-01 PROCEDURE — 97116 GAIT TRAINING THERAPY: CPT

## 2019-04-01 PROCEDURE — 74011250637 HC RX REV CODE- 250/637: Performed by: INTERNAL MEDICINE

## 2019-04-01 PROCEDURE — 74011250637 HC RX REV CODE- 250/637: Performed by: ORTHOPAEDIC SURGERY

## 2019-04-01 PROCEDURE — 74011250637 HC RX REV CODE- 250/637: Performed by: NURSE PRACTITIONER

## 2019-04-01 RX ORDER — ENOXAPARIN SODIUM 100 MG/ML
30 INJECTION SUBCUTANEOUS EVERY 24 HOURS
Qty: 14 SYRINGE | Refills: 0 | Status: SHIPPED | OUTPATIENT
Start: 2019-04-01 | End: 2019-05-01

## 2019-04-01 RX ORDER — TRAMADOL HYDROCHLORIDE 50 MG/1
50 TABLET ORAL
Qty: 10 TAB | Refills: 0 | Status: SHIPPED | OUTPATIENT
Start: 2019-04-01 | End: 2019-04-04

## 2019-04-01 RX ORDER — ENOXAPARIN SODIUM 100 MG/ML
30 INJECTION SUBCUTANEOUS EVERY 24 HOURS
Qty: 14 SYRINGE | Refills: 0 | Status: SHIPPED
Start: 2019-04-01 | End: 2019-04-01 | Stop reason: SDUPTHER

## 2019-04-01 RX ADMIN — DOCUSATE SODIUM 100 MG: 100 CAPSULE, LIQUID FILLED ORAL at 09:16

## 2019-04-01 RX ADMIN — DULOXETINE 60 MG: 60 CAPSULE, DELAYED RELEASE ORAL at 09:16

## 2019-04-01 RX ADMIN — Medication 5 ML: at 05:54

## 2019-04-01 RX ADMIN — TRAMADOL HYDROCHLORIDE 50 MG: 50 TABLET, COATED ORAL at 13:06

## 2019-04-01 RX ADMIN — LEVOTHYROXINE SODIUM 100 MCG: 100 TABLET ORAL at 05:55

## 2019-04-01 RX ADMIN — Medication 1 AMPULE: at 09:16

## 2019-04-01 RX ADMIN — CALCIUM CARBONATE 500 MG (1,250 MG)-VITAMIN D3 200 UNIT TABLET 1 TABLET: at 09:16

## 2019-04-01 RX ADMIN — CALCIUM CARBONATE 500 MG (1,250 MG)-VITAMIN D3 200 UNIT TABLET 1 TABLET: at 13:08

## 2019-04-01 RX ADMIN — ACETAMINOPHEN 650 MG: 325 TABLET, FILM COATED ORAL at 05:54

## 2019-04-01 RX ADMIN — GABAPENTIN 300 MG: 300 CAPSULE ORAL at 05:54

## 2019-04-01 RX ADMIN — TRAMADOL HYDROCHLORIDE 50 MG: 50 TABLET, COATED ORAL at 05:54

## 2019-04-01 RX ADMIN — AMIODARONE HYDROCHLORIDE 200 MG: 200 TABLET ORAL at 09:16

## 2019-04-01 RX ADMIN — GABAPENTIN 300 MG: 300 CAPSULE ORAL at 13:10

## 2019-04-01 RX ADMIN — MONTELUKAST SODIUM 10 MG: 10 TABLET, FILM COATED ORAL at 09:15

## 2019-04-01 RX ADMIN — ACETAMINOPHEN 650 MG: 325 TABLET, FILM COATED ORAL at 13:10

## 2019-04-01 RX ADMIN — PANTOPRAZOLE SODIUM 40 MG: 40 TABLET, DELAYED RELEASE ORAL at 05:54

## 2019-04-01 NOTE — CONSULTS
Geriatric Fracture Consult  Patient: Yamileth Peter  YOB: 1933   MRN: [id number]      Consult Date: 3/27/2019     Consulting Physician: DR Tereza Spicer    Chief Complaint: LEFT INTERTROCHANTERIC HIP FRACTURE; OSTEOPOROSIS  History of Present Illness: J Luis Kamara is an 80 y.o.  female who is being seen for left hip pain after sustaining a fall at home. Onset of symptoms was abrupt with unchanged course since that time. The pain is located in the left hip. Patient describes the pain as continuous and rated as moderate. Pain has been associated with movement. Patient denies other injuries. Review of Systems: A comprehensive review of systems was negative except for that written in the History of Present Illness.   ED Presentation Time: < 8 hours  Mechanism of Injury: Fall from standing  Ambulatory Status: Independent  Past Medical History:   Past Medical History:   Diagnosis Date    Arthritis     Atrial fibrillation ([address]     Dementia     Depression     Heart failure (HCC)     Hyperlipidemia     Hypertension     Hypothyroidism     Other ill-defined conditions(799.89)        Allergies: No Known Allergies   Past Surgical History:   Past Surgical History:   Procedure Laterality Date    HX CHOLECYSTECTOMY      HX HYSTERECTOMY      HX ORTHOPAEDIC      BACK    HX SMALL BOWEL RESECTION  8/8/13    HX TONSILLECTOMY      HX TUBAL LIGATION        Social History:   Social History     Socioeconomic History    Marital status:      Spouse name: Not on file    Number of children: Not on file    Years of education: Not on file    Highest education level: Not on file   Occupational History    Not on file   Social Needs    Financial resource strain: Not on file    Food insecurity:     Worry: Not on file     Inability: Not on file    Transportation needs:     Medical: Not on file     Non-medical: Not on file   Tobacco Use    Smoking status: Never Smoker    Smokeless tobacco: Never Used   Substance and Sexual Activity    Alcohol use: No    Drug use: No    Sexual activity: Never   Lifestyle    Physical activity:     Days per week: Not on file     Minutes per session: Not on file    Stress: Not on file   Relationships    Social connections:     Talks on phone: Not on file     Gets together: Not on file     Attends Zoroastrian service: Not on file     Active member of club or organization: Not on file     Attends meetings of clubs or organizations: Not on file     Relationship status: Not on file    Intimate partner violence:     Fear of current or ex partner: Not on file     Emotionally abused: Not on file     Physically abused: Not on file     Forced sexual activity: Not on file   Other Topics Concern    Not on file   Social History Narrative    Not on file      FAMILY HISTORY - REVIEWED - NO PERTINENT FAMILY HISTORY  Dwelling Status: Alone  Current Anticoagulant Medications: NONE  History of falls: YES  Prior Fractures: DENIES  Osteoporosis Medications: CALCIUM DAILY; VIT D3 5000 UNITS/DAILY  Bone Density Tests: YES 2017  X-RAYS: Left Intertrochanteric Fracture  Physical Exam:   PATIENT COMPLAINING OF LEFT HIP PAIN AFTER A FALL AT HOME. FOCUSED MUSCULOSKELETAL EXAM REVEALS DECREASED ROM TO LEFT LE. THERE IS SHORTENING AND EXTERNAL ROTATION NOTED TO LEFT LE. PATIENT IS TENDER TO PALPATION OVER LEFT HIP AND GROIN. PATIENT HAS PAIN WITH LOG ROLLING. N/V INTACT. DENIES OTHER INJURIES.     Assessment / Plan: ORIF LEFT IT FRACTURE WITH IM NAIL; CONSULT PT/OT - WBAT LEFT LE  RISKS AND BENEFITS WERE ADDRESSED WITH PATIENT AND DAUGTER  Labs:    Lab Results   Component Value Date/Time    HGB 9.5 (L) 03/31/2019 05:08 AM    WBC 6.9 03/31/2019 05:08 AM    INR 1.0 03/27/2019 02:28 PM    Albumin 3.2 03/27/2019 12:48 PM      Preoperative Clearance: YES by Hospitalist          Signed by: Tyrese Chung NP   Today's Date: April 1, 2019

## 2019-04-01 NOTE — PROGRESS NOTES
Hospitalist Progress Note Admit Date:  3/27/2019 12:41 PM  
Name:  Shannan Quintana Age:  80 y.o. 
:  1933 MRN:  [id number] PCP:  Margarita Lindo MD 
Treatment Team: Attending Provider: Acosta Armando MD; Consulting Provider: Kandice Merino MD; Utilization Review: Brandon Almanza; Care Manager: Amari Espinoza Primary Nurse: Eva Pepper RN; Physical Therapy Assistant: Camille Miller PTA; Occupational Therapy Assistant: Yoli Lock; Charge Nurse: Italo Mackay Subjective:  
 
Pt is a 79 yo female with pmh A Fib, dementia, chronic pain who was admitted  with left hip fx.  s/p ORIF 3/28 with Dr. Princess Pina. Doing well, no new issues. Objective:  
 
Patient Vitals for the past 24 hrs: 
 Temp Pulse Resp BP SpO2  
19 0821 98.1 °F (36.7 °C) 82 18 124/62 92 % 19 0358 98.5 °F (36.9 °C) 97 16 159/81 94 % 19 2326 98.7 °F (37.1 °C) 90 16 108/52 90 % 19 1951 98.4 °F (36.9 °C) 80 16 138/67 (!) 89 % 19 1527 97.6 °F (36.4 °C) 78 16 104/61 92 % 19 1106 97.8 °F (36.6 °C) 95 18 116/69 90 % 19 1007     94 % Oxygen Therapy O2 Sat (%): 92 % (19 0821) Pulse via Oximetry: 74 beats per minute (19 0754) O2 Device: Room air (19 1007) O2 Flow Rate (L/min): 2 l/min (19 1112) No intake or output data in the 24 hours ending 19 0912 General:    Well nourished. Alert. Sitting in bedside chair CV:   RRR. No murmur, rub, or gallop. Lungs:   CTAB. No wheezing, rhonchi, or rales. Abdomen:   Soft, nontender, nondistended. Extremities: Warm and dry. No cyanosis or edema. Neuro:  No gross focal deficits Psych:  Awake, alert, disoriented Data Review: 
I have reviewed all labs, meds, telemetry events, and studies from the last 24 hours: 
 
No results found for this or any previous visit (from the past 24 hour(s)). All Micro Results Procedure Component Value Units Date/Time MSSA/MRSA SC BY PCR, NASAL SWAB [[id number]]  (Abnormal) Collected:  03/27/19 1530 Order Status:  Completed Specimen:  Swab Updated:  03/27/19 4226 Special Requests: NO SPECIAL REQUESTS Culture result: MRSA target DNA detected, SA target DNA detected. A positive test result does not necessarily indicate the presence of viable organisms. It is however, presumptive for the presence of MRSA or SA. RESULTS VERIFIED, PHONED TO AND READ BACK BY 
Vahid Escalona RN @7042  3/27/19 No results found for this visit on 03/27/19. Current Meds: 
Current Facility-Administered Medications Medication Dose Route Frequency  0.9% sodium chloride infusion 250 mL  250 mL IntraVENous PRN  
 0.9% sodium chloride infusion 250 mL  250 mL IntraVENous PRN  
 haloperidol lactate (HALDOL) injection 0.5 mg  0.5 mg IntraMUSCular Q4H PRN  
 sodium chloride (NS) flush 5-40 mL  5-40 mL IntraVENous Q8H  
 sodium chloride (NS) flush 5-40 mL  5-40 mL IntraVENous PRN  
 acetaminophen (TYLENOL) tablet 650 mg  650 mg Oral Q8H  
 ondansetron (ZOFRAN) injection 4 mg  4 mg IntraVENous Q4H PRN  
 docusate sodium (COLACE) capsule 100 mg  100 mg Oral BID  alum-mag hydroxide-simeth (MYLANTA) oral suspension 30 mL  30 mL Oral Q4H PRN  
 calcium-vitamin D (OS-AMANDA) 500 mg-200 unit tablet  1 Tab Oral TID WITH MEALS  enoxaparin (LOVENOX) injection 30 mg  30 mg SubCUTAneous Q24H  
 oxyCODONE IR (ROXICODONE) tablet 5 mg  5 mg Oral Q4H PRN  
 traMADol (ULTRAM) tablet 50 mg  50 mg Oral Q6H PRN  
 amiodarone (CORDARONE) tablet 200 mg  200 mg Oral DAILY  donepezil (ARICEPT) tablet 10 mg  10 mg Oral QHS  DULoxetine (CYMBALTA) capsule 60 mg  60 mg Oral BID  
 gabapentin (NEURONTIN) capsule 300 mg  300 mg Oral TID  levothyroxine (SYNTHROID) tablet 100 mcg  100 mcg Oral ACB  montelukast (SINGULAIR) tablet 10 mg  10 mg Oral DAILY  pantoprazole (PROTONIX) tablet 40 mg  40 mg Oral ACB  pravastatin (PRAVACHOL) tablet 40 mg  40 mg Oral QHS  alcohol 62% (NOZIN) nasal  1 Ampule  1 Ampule Topical Q12H Other Studies (last 24 hours): No results found. Assessment and Plan:  
 
Hospital Problems as of 4/1/2019 Date Reviewed: 3/5/2019 Codes Class Noted - Resolved POA * (Principal) Hip fracture, left ([facility] ICD-10-CM: X98.772F ICD-9-CM: 820.8  3/27/2019 - Present Yes Peripheral polyneuropathy ICD-10-CM: G62.9 ICD-9-CM: 356.9  5/21/2018 - Present Yes Alzheimer's dementia without behavioral disturbance (Chronic) ICD-10-CM: G30.9, F02.80 ICD-9-CM: 331.0, 294.10  12/18/2017 - Present Yes Acquired hypothyroidism ICD-10-CM: E03.9 ICD-9-CM: 244.9  12/18/2017 - Present Yes Atrial fibrillation ([facility] ICD-10-CM: I48.91 
ICD-9-CM: 427.31  8/9/2013 - Present Yes HTN (hypertension) (Chronic) ICD-10-CM: I10 
ICD-9-CM: 401.9  11/15/2010 - Present Yes Plan: 
 
Left intertrochanteric hip fx S/p ORIF 3/28 with Dr. Rosa M Espinal - Cont PT/OT, pain management, DVT prophylaxis per ortho Acute post op anemia Hgb 6.4, up to 9.9 after 1 unit pRBCs. Now stable. A Fib Rate controlled on Amio. CHADS 2 
- does not appear that she is on anticoagulation Fibromyalgia, neuropathy Cont home regimen Neurontin, Cymbalta Dementia Previously living at home alone but doubt this will be possible going forward. Her dementia appears too advanced. SW has discussed long term placement with family following STR. Dispo - STR  - pending auth Diet:  DIET REGULAR 
DVT ppx:  Lovenox Signed: 
Shaila Childs MD

## 2019-04-01 NOTE — PROGRESS NOTES
Patient is more alert today Sitting in the chair eating a cookie No family present Encouraged her in her journey Prayer offered [place], staff Juan Antonio baez [address], [address] [facility]  /   [email or web address]

## 2019-04-01 NOTE — PROGRESS NOTES
Pt transported to [place] ([facility]) via [facility].

## 2019-04-01 NOTE — DISCHARGE SUMMARY
Hospitalist Discharge Summary     Patient ID:  Nickolas Schlatter  [id number]  10 y.o.  4/12/1933  Admit date: 3/27/2019 12:41 PM  Discharge date and time: 4/1/2019  Attending: Elisha Merida MD  PCP:  Mariaelena High MD  Treatment Team: Attending Provider: Elisha Merida MD; Consulting Provider: Toña Douglas MD; Utilization Review: Tim Cherry; Care Manager: Ela Ortiz Primary Nurse: Evans Carvajal RN; Physical Therapy Assistant: Kameron Guzman PTA; Occupational Therapy Assistant: Luc Alcazar; Charge Nurse: Megan Baez    Principal Diagnosis Hip fracture, left [facility])   Principal Problem:    Hip fracture, left ([facility] (3/27/2019)    Active Problems:    HTN (hypertension) (11/15/2010)      Atrial fibrillation ([facility] (8/9/2013)      Alzheimer's dementia without behavioral disturbance (12/18/2017)      Acquired hypothyroidism (12/18/2017)      Peripheral polyneuropathy (5/21/2018)             Hospital Course:  Please refer to the admission H&P for details of presentation. In summary, the patient is 81 yo female with pmh A Fib, dementia, chronic pain who was admitted 03/27 with left hip fx.  s/p ORIF 3/28 with Dr. Jose Angel Velasquez. Left intertrochanteric hip fx   S/p ORIF 3/28 with Dr. Jose Angel Velasquez  - Cont PT/OT, pain management  - Lovenox proph at d/c     Acute post op anemia  Hgb 6.4, up to 9.9 after 1 unit pRBCs. Now stable.     A Fib  Rate controlled on Amio. CHADS 2  - does not appear that she is on anticoagulation     Fibromyalgia, neuropathy  Cont home regimen Neurontin, Cymbalta     Dementia  Previously living at home alone but doubt this will be possible going forward. Her dementia appears too advanced. SW has discussed long term placement with family following STR. Significant Diagnostic Studies:   XR FEMUR LT 2 V   Final Result   IMPRESSION: Status post left hip pinning.             CT HEAD WO CONT   Final Result   IMPRESSION:   No acute intracranial process XR HIP LT W OR WO PELV 2-3 VWS   Final Result   Impression:     Comminuted intertrochanteric fracture left hip with no dislocation of the   femoral head. Slight irregularity in bone density noted in the greater trochanter and   subcapital region as described above. XR CHEST SNGL V   Final Result   IMPRESSION: No acute findings in the chest         XR FEMUR LT 2 V   Final Result   Impression:     Left hip intertrochanteric fracture    Slight limited visualization portions of the cortex lower femur as described   above. Remainder of the left femur is unremarkable       NC XR TECHNOLOGIST SERVICE    (Results Pending)           Labs: Results:       Chemistry Recent Labs     03/31/19  0508 03/30/19 0524   GLU 80 96    141   K 4.1 3.3*    105   CO2 32 31   BUN 13 15   CREA 0.65 0.70   CA 8.4 8.2*   AGAP 4* 5*      CBC w/Diff Recent Labs     03/31/19  0508 03/30/19  0524 03/29/19  1452   WBC 6.9 10.2  --    RBC 2.97* 3.08*  --    HGB 9.5* 9.8* 9.9*   HCT 30.1* 31.2*  --     177  --    GRANS 64 77  --    LYMPH 22 12*  --    EOS 3 0*  --       Cardiac Enzymes No results for input(s): CPK, CKND1, MAXINE in the last 72 hours. No lab exists for component: CKRMB, TROIP   Coagulation No results for input(s): PTP, INR, APTT in the last 72 hours. No lab exists for component: INREXT, INREXT    Lipid Panel Lab Results   Component Value Date/Time    Cholesterol, total 147 03/15/2019 03:05 PM    HDL Cholesterol 90 03/15/2019 03:05 PM    LDL,Direct 50 09/01/2011 09:39 AM    LDL, calculated 42 03/15/2019 03:05 PM    VLDL, calculated 15 03/15/2019 03:05 PM    Triglyceride 76 03/15/2019 03:05 PM    CHOL/HDL Ratio 1.9 09/01/2011 09:39 AM      BNP No results for input(s): BNPP in the last 72 hours. Liver Enzymes No results for input(s): TP, ALB, TBIL, AP, SGOT, GPT in the last 72 hours.     No lab exists for component: DBIL   Thyroid Studies Lab Results   Component Value Date/Time    T4, Total 12.3 09/07/2013 06:15 AM    TSH 0.088 (L) 03/15/2019 03:05 PM            Discharge Exam:  Visit Vitals  /62 (BP 1 Location: Right arm, BP Patient Position: At rest)   Pulse 82   Temp 98.1 °F (36.7 °C)   Resp 18   Ht 5' 2\" (1.575 m)   Wt 52.6 kg (116 lb)   SpO2 92%   BMI 21.22 kg/m²     General appearance: alert, cooperative, no distress, appears stated age  Lungs: clear to auscultation bilaterally  Heart: regular rate and rhythm, S1, S2 normal, no murmur, click, rub or gallop  Abdomen: soft, non-tender. Bowel sounds normal. No masses,  no organomegaly  Extremities: no cyanosis or edema  Neurologic: pleasantly confused    Disposition: SNF  Discharge Condition: stable  Patient Instructions:   Current Discharge Medication List      START taking these medications    Details   traMADol (ULTRAM) 50 mg tablet Take 1 Tab by mouth every six (6) hours as needed for Pain for up to 3 days. Max Daily Amount: 200 mg. Qty: 10 Tab, Refills: 0    Associated Diagnoses: Closed 2-part intertrochanteric fracture of left femur, initial encounter ([facility] [address]      enoxaparin (LOVENOX) 30 mg/0.3 mL injection 0.3 mL by SubCUTAneous route every twenty-four (24) hours for 30 days. Qty: 14 Syringe, Refills: 0         CONTINUE these medications which have NOT CHANGED    Details   donepezil (ARICEPT) 10 mg tablet Take 10 mg by mouth nightly. zolpidem (AMBIEN) 5 mg tablet Take 1 Tab by mouth nightly as needed (insomnia). Max Daily Amount: 5 mg. Qty: 30 Tab, Refills: 3    Associated Diagnoses: Primary insomnia      DULoxetine (CYMBALTA) 60 mg capsule Take 1 Cap by mouth two (2) times a day. Qty: 180 Cap, Refills: 2    Associated Diagnoses: Recurrent major depressive disorder, in partial remission (HCC)      levothyroxine (SYNTHROID) 100 mcg tablet Take 1 Tab by mouth Daily (before breakfast). Qty: 90 Tab, Refills: 3    Associated Diagnoses: Hypothyroidism, acquired      montelukast (SINGULAIR) 10 mg tablet Take 1 Tab by mouth daily.   Qty: 90 Tab, Refills: 2    Associated Diagnoses: Seasonal allergic rhinitis due to pollen      azelastine (ASTELIN) 137 mcg (0.1 %) nasal spray 1 [place] by Both Nostrils route two (2) times a day. Use in each nostril as directed  Qty: 1 Bottle, Refills: 3    Associated Diagnoses: Seasonal allergic rhinitis due to pollen      gabapentin (NEURONTIN) 300 mg capsule Take 1 Cap by mouth three (3) times daily. Qty: 270 Cap, Refills: 3    Associated Diagnoses: Peripheral polyneuropathy      pantoprazole (PROTONIX) 40 mg tablet Take 1 Tab by mouth daily. Qty: 90 Tab, Refills: 2    Associated Diagnoses: Gastroesophageal reflux disease without esophagitis      !! fluticasone (FLONASE) 50 mcg/actuation nasal spray 2 Sprays by Both Nostrils route daily. Qty: 2 Bottle, Refills: 3    Associated Diagnoses: Seasonal allergic rhinitis due to pollen      amiodarone (CORDARONE) 200 mg tablet Take 1 Tab by mouth daily. Qty: 90 Tab, Refills: 3    Associated Diagnoses: Paroxysmal atrial fibrillation (HCC)      pravastatin (PRAVACHOL) 40 mg tablet Take 1 Tab by mouth nightly. Qty: 90 Tab, Refills: 3    Associated Diagnoses: Pure hypercholesterolemia      cyanocobalamin (VITAMIN B-12) 2,500 mcg sublingual tablet Take 2,500 mcg by mouth daily. cholecalciferol, VITAMIN D3, (VITAMIN D3) 5,000 unit tab tablet Take  by mouth daily. CALCIUM CITRATE PO Take  by mouth. ascorbic acid, vitamin C, (VITAMIN C) 1,000 mg tablet Take  by mouth. zinc gluconate 100 mg tab Take  by mouth.      multivitamin (HAIR,SKIN AND NAILS) tablet Take 1 Tab by mouth daily. !! fluticasone (FLONASE) 50 mcg/actuation nasal spray 2 Sprays by Both Nostrils route daily. Qty: 1 Bottle, Refills: 0    Associated Diagnoses: Chronic nonseasonal allergic rhinitis due to pollen      acetaminophen (TYLENOL EXTRA STRENGTH) 500 mg tablet Take  by mouth every six (6) hours as needed for Pain. !! - Potential duplicate medications found.  Please discuss with provider.           Activity: PT/OT Eval and Treat  Diet: Regular Diet  Wound Care: As directed    Follow-up with house MD in 1 week, ortho as scheduled  ·     Time spent to discharge patient 38 minutes  Signed:  Winsome Howard MD  4/1/2019  11:23 AM

## 2019-04-01 NOTE — PROGRESS NOTES
Patient is discharging to [facility] at [facility] today. Transport at [facility] via [facility]. CM notified patient and family via telephone call to son, Denisha Navarro. Telephone number for [facility], patient's daughter has been disconnected / is not accepting calls. RN provided with report number. Care Management Interventions PCP Verified by CM: Yes Mode of Transport at Discharge: BLS Transition of Care Consult (CM Consult): Discharge Planning, SNF(STR) Discharge Durable Medical Equipment: No 
Physical Therapy Consult: Yes Occupational Therapy Consult: Yes Speech Therapy Consult: Yes Current Support Network: Own Home, Lives Alone, Family Lives Nearby(Daughter lives 15 mintues away) Confirm Follow Up Transport: Family Plan discussed with Pt/Family/Caregiver: Yes(Spoke with daughter via telephone. ) Freedom of Choice Offered: Yes Discharge Location Discharge Placement: Rehab Unit Subacute([facility])

## 2019-04-01 NOTE — PROGRESS NOTES
Problem: Mobility Impaired (Adult and Pediatric) Goal: *Acute Goals and Plan of Care (Insert Text) Description STG: 
(1.)Ms. Stefano Hernandez will move from supine to sit and sit to supine  with CONTACT GUARD ASSIST within 3 treatment day(s). (2.)Ms. Stefano Hernandez will transfer from bed to chair and chair to bed with CONTACT GUARD ASSIST using the least restrictive device within 3 treatment day(s). (3.)Ms. Stefano Hernandez will ambulate with CONTACT GUARD ASSIST for 30 feet with the least restrictive device within 3 treatment day(s). LTG: 
(1.)Ms. Stefano Hernandez will move from supine to sit and sit to supine  in bed with STAND BY ASSIST within 7 treatment day(s). (2.)Ms. Stefano Hernandez will transfer from bed to chair and chair to bed with STAND BY ASSIST using the least restrictive device within 7 treatment day(s). (3.)Ms. Stefano Hernandez will ambulate with STAND BY ASSIST for 100+ feet with the least restrictive device within 7 treatment day(s). ________________________________________________________________________________________________ Outcome: Progressing Towards Goal 
PHYSICAL THERAPY: Daily Note and AM 4/1/2019 INPATIENT: PT Visit Days : 5 Payor: [facility] MEDICARE / Plan: [facility]/PPO / Product Type: Managed Care Medicare /   
  
NAME/AGE/GENDER: Aarti Dietrich is a 80 y.o. female PRIMARY DIAGNOSIS: Hip fracture, left ([address] [S72.002A] Hip fracture, left ([address] Hip fracture, left ([address] Procedure(s) (LRB): LEFT FEMUR INSERTION INTRA MEDULLARY NAIL (Left) 4 Days Post-Op ICD-10: Treatment Diagnosis:  
 · Generalized Muscle Weakness (M62.81) · Difficulty in walking, Not elsewhere classified (R26.2) · Repeated Falls (R29.6) · History of falling (Z91.81) Precaution/Allergies: 
Patient has no known allergies. WBAT L LE  
ASSESSMENT:  
Ms. Stefano Hernandez was supine upon contact and agreeable to PT. Patient is pleasantly confused but follows commands.  Patient performs supine to sit and transfer to standing with min assist, additional time, and cues for improved/proper technique. Once standing patient able to perform gait training x 30' with use of rolling walker, min assist, and below cues in gait section. Patient then participates in therapeutic strengthening exercises to improve functional strength for transfers, gait and overall mobility. Patient requires cues and assistance to perform exercises correctly. Overall slow progress towards physical therapy goals. Patient's goals listed above are still appropriate. Will continue skilled PT to address remaining deficits. This section established at most recent assessment PROBLEM LIST (Impairments causing functional limitations): 1. Decreased Strength 2. Decreased ADL/Functional Activities 3. Decreased Transfer Abilities 4. Decreased Ambulation Ability/Technique 5. Decreased Balance 6. Increased Pain 7. Decreased Flexibility/Joint Mobility 8. Decreased Knowledge of Precautions 9. Decreased Green with Home Exercise Program 
10. Decreased Cognition INTERVENTIONS PLANNED: (Benefits and precautions of physical therapy have been discussed with the patient.) 1. Balance Exercise 2. Bed Mobility 3. Family Education 4. Gait Training 5. Home Exercise Program (HEP) 6. Neuromuscular Re-education/Strengthening 7. Range of Motion (ROM) 8. Therapeutic Activites 9. Therapeutic Exercise/Strengthening 10. Transfer Training TREATMENT PLAN: Frequency/Duration: twice daily for duration of hospital stay Rehabilitation Potential For Stated Goals: Good RECOMMENDED REHABILITATION/EQUIPMENT: (at time of discharge pending progress): Due to the probability of continued deficits (see above) this patient will likely need continued skilled physical therapy after discharge. Equipment:  
? None at this time HISTORY:  
History of Present Injury/Illness (Reason for Referral): S/p LEFT FEMUR INSERTION INTRA MEDULLARY NAIL Past Medical History/Comorbidities: Ms. Franck Short  has a past medical history of Arthritis, Atrial fibrillation ([address] Dementia, Depression, Heart failure ([address] Hyperlipidemia, Hypertension, Hypothyroidism, and Other ill-defined conditions(799.89). Ms. Franck Short  has a past surgical history that includes hx orthopaedic; hx cholecystectomy; hx tubal ligation; hx hysterectomy; hx tonsillectomy; and hx small bowel resection (8/8/13). Social History/Living Environment:  
Home Environment: Private residence # Steps to Enter: 2 Rails to Enter: Yes One/Two Story Residence: One story Living Alone: Yes Support Systems: (states she doesn't have any family) Patient Expects to be Discharged to[de-identified] Rehabilitation facility Current DME Used/Available at Home: Kory Scott, [address] [facility] Luis chair, casey Joiner Prior Level of Function/Work/Activity: 
Independent, drives, history of falling Number of Personal Factors/Comorbidities that affect the Plan of Care: 3+: HIGH COMPLEXITY EXAMINATION:  
Most Recent Physical Functioning:  
Gross Assessment: 
  
         
  
Posture: 
  
Balance: 
Sitting: Impaired Sitting - Static: Good (unsupported) Sitting - Dynamic: Fair (occasional) Standing: Impaired; With support Standing - Static: Fair Standing - Dynamic : Fair Bed Mobility: 
Supine to Sit: Minimum assistance Sit to Supine: (NT) Wheelchair Mobility: 
  
Transfers: 
Sit to Stand: Minimum assistance Stand to Sit: Minimum assistance Gait: 
Left Side Weight Bearing: As tolerated Base of Support: Center of gravity altered;Narrowed Speed/Ashley: Shuffled; Slow Step Length: Left shortened;Right shortened Gait Abnormalities: Decreased step clearance;Trunk sway increased; Step to gait; Shuffling gait Distance (ft): 30 Feet (ft) Assistive Device: Walker, rolling Ambulation - Level of Assistance: Minimal assistance Interventions: Safety awareness training; Tactile cues; Verbal cues Body Structures Involved: 1. Nerves 2. Bones 3. Joints 4. Muscles 5. Ligaments Body Functions Affected: 1. Mental 
2. Sensory/Pain 3. Cardio 4. Respiratory 5. Neuromusculoskeletal 
6. Movement Related Activities and Participation Affected: 1. Learning and Applying Knowledge 2. General Tasks and Demands 3. Mobility 4. Self Care 5. Domestic Life 6. Interpersonal Interactions and Relationships 7. Community, Social and [place] Number of elements that affect the Plan of Care: 4+: HIGH COMPLEXITY CLINICAL PRESENTATION:  
Presentation: Evolving clinical presentation with changing clinical characteristics: MODERATE COMPLEXITY CLINICAL DECISION MAKIN[address] [place] [address] AM-PAC 6 Clicks Basic Mobility Inpatient Short Form How much difficulty does the patient currently have. .. Unable A Lot A Little None 1. Turning over in bed (including adjusting bedclothes, sheets and blankets)? ? 1   ? 2   ? 3   ? 4  
2. Sitting down on and standing up from a chair with arms ( e.g., wheelchair, bedside commode, etc.)   ? 1   ? 2   ? 3   ? 4  
3. Moving from lying on back to sitting on the side of the bed?   ? 1   ? 2   ? 3   ? 4 How much help from another person does the patient currently need. .. Total A Lot A Little None 4. Moving to and from a bed to a chair (including a wheelchair)? ? 1   ? 2   ? 3   ? 4  
5. Need to walk in hospital room? ? 1   ? 2   ? 3   ? 4  
6. Climbing 3-5 steps with a railing? ? 1   ? 2   ? 3   ? 4  
© , Trustees of [address] [place] [address], under license to [employer]. All rights reserved Score:  Initial: 16 Most Recent: X (Date: -- ) Interpretation of Tool:  Represents activities that are increasingly more difficult (i.e. Bed mobility, Transfers, Gait). Medical Necessity:    
· Patient is expected to demonstrate progress in strength, range of motion, balance, coordination and functional technique ·  to decrease assistance required with gait, transfers, and functional mobility. · . Reason for Services/Other Comments: 
· Patient continues to require skilled intervention due to decreased strength, decreased balance, decreased functional tolerance, decreased cardiopulmonary endurance affecting participation in basic ADLs and functional tasks · . Use of outcome tool(s) and clinical judgement create a POC that gives a: Clear prediction of patient's progress: LOW COMPLEXITY  
  
 
 
 
TREATMENT:  
  
Pre-treatment Symptoms/Complaints:  No complaints Pain: Initial:  
Pain Intensity 1: 0   Post Session:  0 visual  
 
Therapeutic Activity: (     minutes): Therapeutic activities including Bed transfers, Chair transfers, Ambulation on level ground and cues for ease and safety of transfers  to improve mobility, strength, balance and coordination. Required minimal Safety awareness training; Tactile cues; Verbal cues to promote static and dynamic balance in standing and promote coordination of bilateral, upper extremity(s), lower extremity(s). Gait Training (  14 Minutes):  Gait training to improve and/or restore physical functioning as related to mobility, strength and balance. Ambulated 30 Feet (ft) with Minimal assistance using a Walker, rolling and moderate Safety awareness training; Tactile cues; Verbal cues related to their sequencing, walker manipulation, posture, step length, weight shifts, UE support on rolling walker, posture training, step length, and proximity to rolling walkedr to promote proper body alignment, promote proper body posture, promote proper body mechanics and promote proper body breathing techniques. Instruction in performance of the above deficits to correct overall gait quality and functional mobility. Therapeutic Exercise: (  10 minutes):  Exercises per grid below to improve mobility and strength. Required minimal visual, verbal and manual cues to promote proper body posture and promote proper body mechanics. Progressed range and repetitions as indicated. Date: 
3/31/19 Date: 
3/31/19 Date: 
4/1/19 ACTIVITY/EXERCISE AM PM AM AM  
Ambulation:           Distance Device Duration Seated Heel Raises X 20 B  2x20B A x20B A Seated Toe Raises X 20 B  2x20B A x20B A Seated Long Arc Quads X 10 B  
AA to L  2x15B A x15B A Seated Marching X 10 B    AA to L  x15B AA-L, A-R x15B AA-L, A-R Seated Hip Abduction X 10 B  x15B AA-L, A-R x15B AA-L, A-R  
      
      
      
B = bilateral; AA = active assistive; A = active; P = passive Braces/Orthotics/Lines/Etc:  
· room air Treatment/Session Assessment:   
· Response to Treatment:  See above · Interdisciplinary Collaboration:  
o Physical Therapy Assistant 
o Registered Nurse · After treatment position/precautions:  
o Up in chair 
o Bed alarm/tab alert on 
o Bed/Chair-wheels locked 
o Call light within reach 
o RN notified · Compliance with Program/Exercises: Will assess as treatment progresses · Recommendations/Intent for next treatment session: \"Next visit will focus on advancements to more challenging activities and reduction in assistance provided\". Total Treatment Duration: PT Patient Time In/Time Out Time In: 0825 Time Out: 5629 Jimmie Sanchez PTA

## 2019-04-09 ENCOUNTER — HOSPITAL ENCOUNTER (OUTPATIENT)
Dept: LAB | Age: 84
Discharge: HOME | End: 2019-04-09
Payer: MEDICARE

## 2019-04-09 PROCEDURE — 87186 SC STD MICRODIL/AGAR DIL: CPT

## 2019-04-09 PROCEDURE — 87205 SMEAR GRAM STAIN: CPT

## 2019-04-09 PROCEDURE — 87077 CULTURE AEROBIC IDENTIFY: CPT

## 2019-04-15 LAB
BACTERIA SPEC CULT: ABNORMAL
BACTERIA SPEC CULT: ABNORMAL
GRAM STN SPEC: ABNORMAL
GRAM STN SPEC: ABNORMAL
SERVICE CMNT-IMP: ABNORMAL

## 2019-07-12 ENCOUNTER — PATIENT OUTREACH (OUTPATIENT)
Dept: CASE MANAGEMENT | Age: 84
End: 2019-07-12

## 2019-07-26 ENCOUNTER — PATIENT OUTREACH (OUTPATIENT)
Dept: CASE MANAGEMENT | Age: 84
End: 2019-07-26

## 2020-07-24 ENCOUNTER — HOSPITAL ENCOUNTER (EMERGENCY)
Age: 85
Discharge: HOME | End: 2020-07-24
Attending: EMERGENCY MEDICINE
Payer: MEDICARE

## 2020-07-24 ENCOUNTER — APPOINTMENT (OUTPATIENT)
Dept: GENERAL RADIOLOGY | Age: 85
End: 2020-07-24
Attending: EMERGENCY MEDICINE
Payer: MEDICARE

## 2020-07-24 ENCOUNTER — APPOINTMENT (OUTPATIENT)
Dept: CT IMAGING | Age: 85
End: 2020-07-24
Attending: EMERGENCY MEDICINE
Payer: MEDICARE

## 2020-07-24 VITALS
SYSTOLIC BLOOD PRESSURE: 128 MMHG | TEMPERATURE: 98.5 F | WEIGHT: 130 LBS | HEIGHT: 62 IN | HEART RATE: 85 BPM | OXYGEN SATURATION: 93 % | BODY MASS INDEX: 23.92 KG/M2 | DIASTOLIC BLOOD PRESSURE: 69 MMHG | RESPIRATION RATE: 18 BRPM

## 2020-07-24 DIAGNOSIS — S32.599A CLOSED FRACTURE OF PUBIC RAMUS, UNSPECIFIED LATERALITY, INITIAL ENCOUNTER (HCC): Primary | ICD-10-CM

## 2020-07-24 PROCEDURE — 72131 CT LUMBAR SPINE W/O DYE: CPT

## 2020-07-24 PROCEDURE — 96374 THER/PROPH/DIAG INJ IV PUSH: CPT

## 2020-07-24 PROCEDURE — 74011250636 HC RX REV CODE- 250/636

## 2020-07-24 PROCEDURE — 72192 CT PELVIS W/O DYE: CPT

## 2020-07-24 PROCEDURE — 99284 EMERGENCY DEPT VISIT MOD MDM: CPT

## 2020-07-24 PROCEDURE — 72125 CT NECK SPINE W/O DYE: CPT

## 2020-07-24 PROCEDURE — 71101 X-RAY EXAM UNILAT RIBS/CHEST: CPT

## 2020-07-24 PROCEDURE — 72100 X-RAY EXAM L-S SPINE 2/3 VWS: CPT

## 2020-07-24 RX ORDER — HYDROCODONE BITARTRATE AND ACETAMINOPHEN 5; 325 MG/1; MG/1
1 TABLET ORAL
Qty: 15 TAB | Refills: 0 | Status: SHIPPED | OUTPATIENT
Start: 2020-07-24 | End: 2020-07-29

## 2020-07-24 RX ORDER — MORPHINE SULFATE 2 MG/ML
4 INJECTION, SOLUTION INTRAMUSCULAR; INTRAVENOUS
Status: DISCONTINUED | OUTPATIENT
Start: 2020-07-24 | End: 2020-07-24 | Stop reason: HOSPADM

## 2020-07-24 RX ORDER — MORPHINE SULFATE 4 MG/ML
INJECTION INTRAVENOUS
Status: COMPLETED
Start: 2020-07-24 | End: 2020-07-24

## 2020-07-24 RX ADMIN — MORPHINE SULFATE 4 MG: 4 INJECTION INTRAVENOUS at 14:23

## 2020-07-24 NOTE — ED NOTES
Patient has been crying uncontrollably c/o pain to lower back and sacral area. Patient received 4mg morphine, but she states that this isn't helping the pain. She is tender to palpation on sacral area.  Notified MD.

## 2020-07-24 NOTE — ED PROVIDER NOTES
Patient has a history of, hyperlipidemia, heart failure, atrial fibrillation and dementia. Per the nursing home staff via EMS, patient has a history of frequent falls. She was complaining of pain earlier today to her back and neck and left chest.  There was no report of fall that was witnessed. She was sent here for evaluation of her pain possibly due to a fall. EMS reported her O2 sat was 92% on room air, she was placed on supplemental oxygen in route.            Past Medical History:   Diagnosis Date    Arthritis     Atrial fibrillation ([address]     Dementia     Depression     Heart failure (HCC)     Hyperlipidemia     Hypertension     Hypothyroidism     Other ill-defined conditions(799.89)        Past Surgical History:   Procedure Laterality Date    HX CHOLECYSTECTOMY      HX HYSTERECTOMY      HX ORTHOPAEDIC      BACK    HX SMALL BOWEL RESECTION  8/8/13    HX TONSILLECTOMY      HX TUBAL LIGATION           Family History:   Problem Relation Age of Onset    Heart Disease Mother     Cancer Father         bladder    Cancer Brother         lung       Social History     Socioeconomic History    Marital status:      Spouse name: Not on file    Number of children: Not on file    Years of education: Not on file    Highest education level: Not on file   Occupational History    Not on file   Social Needs    Financial resource strain: Not on file    Food insecurity     Worry: Not on file     Inability: Not on file    Transportation needs     Medical: Not on file     Non-medical: Not on file   Tobacco Use    Smoking status: Never Smoker    Smokeless tobacco: Never Used   Substance and Sexual Activity    Alcohol use: No    Drug use: No    Sexual activity: Never   Lifestyle    Physical activity     Days per week: Not on file     Minutes per session: Not on file    Stress: Not on file   Relationships    Social connections     Talks on phone: Not on file     Gets together: Not on file Attends Restorationism service: Not on file     Active member of club or organization: Not on file     Attends meetings of clubs or organizations: Not on file     Relationship status: Not on file    Intimate partner violence     Fear of current or ex partner: Not on file     Emotionally abused: Not on file     Physically abused: Not on file     Forced sexual activity: Not on file   Other Topics Concern    Not on file   Social History Narrative    Not on file         ALLERGIES: Patient has no known allergies. Review of Systems   Constitutional: Negative for chills and fever. Gastrointestinal: Negative for nausea and vomiting. All other systems reviewed and are negative. Vitals:    07/24/20 1155 07/24/20 1217 07/24/20 1221   BP: 164/87  184/89   Pulse:  98 92   Resp: 16     Temp: 98.5 °F (36.9 °C)     SpO2: 90% (!) 84% 94%   Weight: 59 kg (130 lb)     Height: 5' 2\" (1.575 m)              Physical Exam  Vitals signs and nursing note reviewed. Constitutional:       Appearance: Normal appearance. She is well-developed and normal weight. HENT:      Head: Normocephalic and atraumatic. Eyes:      Conjunctiva/sclera: Conjunctivae normal.      Pupils: Pupils are equal, round, and reactive to light. Neck:      Musculoskeletal: Normal range of motion and neck supple. Cardiovascular:      Rate and Rhythm: Normal rate and regular rhythm. Heart sounds: Normal heart sounds. Pulmonary:      Effort: Pulmonary effort is normal. No respiratory distress. Breath sounds: Normal breath sounds. Abdominal:      General: Bowel sounds are normal.      Palpations: Abdomen is soft. Musculoskeletal:         General: Tenderness present. Back:         Arms:       Comments: Tenderness palpation left anterior chest wall as indicated, posterior neck and lumbar spine as indicated. Skin:     General: Skin is warm and dry.    Psychiatric:         Behavior: Behavior normal.          MDM  Number of Diagnoses or Management Options  Closed fracture of pubic ramus, unspecified laterality, initial encounter [facility]): new and requires workup  Diagnosis management comments: 3:40 PM further history obtained from the nursing home. They state that she is typically ambulatory by herself and is not on supplemental oxygen. She has a history of falls though the last known fall was July 21. She was started on tramadol on July 20.  4:02 PM O2 sat 94% on RA after getting back from CT, awaiting results  4:28 PM CTs show bilateral pubic ramus fractures as well as sacral insufficiency fractures. Patient was able to stand and bear weight on her own. Her fractures are painful but she should be able to bear weight on both legs.        Amount and/or Complexity of Data Reviewed  Tests in the radiology section of CPT®: ordered and reviewed  Obtain history from someone other than the patient: yes    Risk of Complications, Morbidity, and/or Mortality  Presenting problems: moderate  Diagnostic procedures: moderate  Management options: moderate    Patient Progress  Patient progress: stable         Procedures

## 2020-07-24 NOTE — DISCHARGE INSTRUCTIONS
Follow-up with [facility] orthopedics, call them to make an appointment. Return to the emergency department if your symptoms worsen despite the prescription pain medicine.

## 2020-07-24 NOTE — ED TRIAGE NOTES
Pt arrives via EMS from [facility]. Pt has been having multiple falls, not new for her. States pt has been complaining of neck, lower back, and chest wall pain. Unsure if pt fell last night per facility. Pt is at baseline mentation, hx of dementia. 20g L wrist. NAD. Masked.

## 2020-07-24 NOTE — ED NOTES
[facility] at bedside to transport patient back to facility. Provided them with discharge packet to give to facility.

## 2020-07-24 NOTE — ED NOTES
Spoke with Ni Camacho at [facility] in regards to patient. Aware we will be sending patient back with pain medication. All questions answered.

## 2020-10-02 ENCOUNTER — HOSPITAL ENCOUNTER (OUTPATIENT)
Dept: LAB | Age: 85
Discharge: HOME | End: 2020-10-02

## 2020-10-02 LAB
BASOPHILS # BLD: 0 K/UL (ref 0–0.2)
BASOPHILS NFR BLD: 1 % (ref 0–2)
DIFFERENTIAL METHOD BLD: ABNORMAL
EOSINOPHIL # BLD: 0.1 K/UL (ref 0–0.8)
EOSINOPHIL NFR BLD: 1 % (ref 0.5–7.8)
ERYTHROCYTE [DISTWIDTH] IN BLOOD BY AUTOMATED COUNT: 14.8 % (ref 11.9–14.6)
HCT VFR BLD AUTO: 39.1 % (ref 35.8–46.3)
HGB BLD-MCNC: 11.6 G/DL (ref 11.7–15.4)
IMM GRANULOCYTES # BLD AUTO: 0 K/UL (ref 0–0.5)
IMM GRANULOCYTES NFR BLD AUTO: 1 % (ref 0–5)
LYMPHOCYTES # BLD: 1.3 K/UL (ref 0.5–4.6)
LYMPHOCYTES NFR BLD: 21 % (ref 13–44)
MCH RBC QN AUTO: 31.2 PG (ref 26.1–32.9)
MCHC RBC AUTO-ENTMCNC: 29.7 G/DL (ref 31.4–35)
MCV RBC AUTO: 105.1 FL (ref 79.6–97.8)
MONOCYTES # BLD: 1 K/UL (ref 0.1–1.3)
MONOCYTES NFR BLD: 17 % (ref 4–12)
NEUTS SEG # BLD: 3.7 K/UL (ref 1.7–8.2)
NEUTS SEG NFR BLD: 60 % (ref 43–78)
NRBC # BLD: 0 K/UL (ref 0–0.2)
PLATELET # BLD AUTO: 235 K/UL (ref 150–450)
PMV BLD AUTO: 9.7 FL (ref 9.4–12.3)
RBC # BLD AUTO: 3.72 M/UL (ref 4.05–5.2)
WBC # BLD AUTO: 6.1 K/UL (ref 4.3–11.1)

## 2020-10-02 PROCEDURE — 85025 COMPLETE CBC W/AUTO DIFF WBC: CPT

## 2021-08-03 PROBLEM — E03.9 ACQUIRED HYPOTHYROIDISM: Status: RESOLVED | Noted: 2017-12-18 | Resolved: 2021-08-03

## (undated) DEVICE — SUTURE MCRYL SZ 0 L27IN ABSRB VLT CT-1 L36MM 1/2 CIR TAPR Y340H

## (undated) DEVICE — SOLUTION IV 1000ML 0.9% SOD CHL

## (undated) DEVICE — INTENDED FOR TISSUE SEPARATION, AND OTHER PROCEDURES THAT REQUIRE A SHARP SURGICAL BLADE TO PUNCTURE OR CUT.: Brand: BARD-PARKER ® STAINLESS STEEL BLADES

## (undated) DEVICE — ROD RMR L950MM DIA2.5MM W/ EXTN BALL TIP

## (undated) DEVICE — 1010 S-DRAPE TOWEL DRAPE 10/BX: Brand: STERI-DRAPE™

## (undated) DEVICE — DRAPE PT ISOLATN 130 IN X 96 IN

## (undated) DEVICE — (D)PREP SKN CHLRAPRP APPL 26ML -- CONVERT TO ITEM 371833

## (undated) DEVICE — 3.2MM GUIDE WIRE 400MM

## (undated) DEVICE — X-LARGE COTTON GLOVE: Brand: DEROYAL

## (undated) DEVICE — SLIM BODY SKIN STAPLER: Brand: APPOSE ULC

## (undated) DEVICE — SUTURE MCRYL SZ 2-0 L27IN ABSRB VLT CT-1 L36MM 1/2 CIR TAPR Y339H

## (undated) DEVICE — AMD ANTIMICROBIAL GAUZE SPONGES,12 PLY USP TYPE VII, 0.2% POLYHEXAMETHYLENE BIGUANIDE HCI (PHMB): Brand: CURITY

## (undated) DEVICE — GOWN,SIRUS,NONRNF,SETINSLV,XL,20/CS: Brand: MEDLINE

## (undated) DEVICE — 2000CC GUARDIAN II: Brand: GUARDIAN

## (undated) DEVICE — SURGICAL PROCEDURE PACK BASIC ST FRANCIS

## (undated) DEVICE — OCCLUSIVE GAUZE STRIP,3% BISMUTH TRIBROMOPHENATE IN PETROLATUM BLEND: Brand: XEROFORM

## (undated) DEVICE — 3M™ TEGADERM™ TRANSPARENT FILM DRESSING FRAME STYLE, 1628, 6 IN X 8 IN (15 CM X 20 CM), 10/CT 8CT/CASE: Brand: 3M™ TEGADERM™

## (undated) DEVICE — REM POLYHESIVE ADULT PATIENT RETURN ELECTRODE: Brand: VALLEYLAB